# Patient Record
Sex: MALE | Race: WHITE | Employment: OTHER | URBAN - METROPOLITAN AREA
[De-identification: names, ages, dates, MRNs, and addresses within clinical notes are randomized per-mention and may not be internally consistent; named-entity substitution may affect disease eponyms.]

---

## 2017-01-23 ENCOUNTER — TELEPHONE (OUTPATIENT)
Dept: FAMILY MEDICINE CLINIC | Age: 47
End: 2017-01-23

## 2017-01-23 DIAGNOSIS — E78.5 HYPERLIPIDEMIA, UNSPECIFIED HYPERLIPIDEMIA TYPE: Primary | ICD-10-CM

## 2017-01-31 LAB
ALBUMIN SERPL-MCNC: 4.7 G/DL (ref 3.2–5.3)
ALK PHOSPHATASE: 51 IU/L (ref 35–121)
ALT SERPL-CCNC: 107 IU/L (ref 5–59)
ANION GAP SERPL CALCULATED.3IONS-SCNC: 10 MMOL/L
AST SERPL-CCNC: 38 IU/L (ref 10–42)
BILIRUB SERPL-MCNC: 0.7 MG/DL (ref 0.2–1.3)
BUN BLDV-MCNC: 19 MG/DL (ref 10–20)
CALCIUM SERPL-MCNC: 10.1 MG/DL (ref 8.7–10.8)
CHLORIDE BLD-SCNC: 101 MMOL/L (ref 95–111)
CHOLESTEROL/HDL RATIO: 7.9
CHOLESTEROL: 314 MG/DL
CO2: 30 MMOL/L (ref 21–32)
CREAT SERPL-MCNC: 1 MG/DL (ref 0.5–1.3)
EGFR AFRICAN AMERICAN: 98
EGFR IF NONAFRICAN AMERICAN: 81
GLUCOSE: 95 MG/DL (ref 70–100)
HDLC SERPL-MCNC: 40 MG/DL (ref 40–60)
POTASSIUM SERPL-SCNC: 4.1 MMOL/L (ref 3.5–5.4)
SODIUM BLD-SCNC: 137 MMOL/L (ref 134–147)
TOTAL PROTEIN: 7.5 G/DL (ref 5.8–8)
TRIGL SERPL-MCNC: 681 MG/DL

## 2017-02-01 ENCOUNTER — OFFICE VISIT (OUTPATIENT)
Dept: FAMILY MEDICINE CLINIC | Age: 47
End: 2017-02-01

## 2017-02-01 VITALS
BODY MASS INDEX: 29.03 KG/M2 | SYSTOLIC BLOOD PRESSURE: 124 MMHG | RESPIRATION RATE: 12 BRPM | HEART RATE: 76 BPM | HEIGHT: 69 IN | DIASTOLIC BLOOD PRESSURE: 96 MMHG | TEMPERATURE: 98.1 F | WEIGHT: 196 LBS

## 2017-02-01 DIAGNOSIS — E66.3 OVERWEIGHT (BMI 25.0-29.9): ICD-10-CM

## 2017-02-01 DIAGNOSIS — E78.2 MIXED HYPERLIPIDEMIA: Primary | ICD-10-CM

## 2017-02-01 DIAGNOSIS — R74.01 ELEVATED ALANINE AMINOTRANSFERASE (ALT) LEVEL: ICD-10-CM

## 2017-02-01 DIAGNOSIS — Z11.4 SCREENING FOR HIV (HUMAN IMMUNODEFICIENCY VIRUS): ICD-10-CM

## 2017-02-01 PROCEDURE — 99214 OFFICE O/P EST MOD 30 MIN: CPT | Performed by: NURSE PRACTITIONER

## 2017-02-01 RX ORDER — ATORVASTATIN CALCIUM 20 MG/1
20 TABLET, FILM COATED ORAL DAILY
Qty: 30 TABLET | Refills: 1 | Status: SHIPPED | OUTPATIENT
Start: 2017-02-01 | End: 2017-03-30 | Stop reason: SDUPTHER

## 2017-02-01 ASSESSMENT — ENCOUNTER SYMPTOMS
APNEA: 0
CONSTIPATION: 0
TROUBLE SWALLOWING: 0
VOMITING: 0
SHORTNESS OF BREATH: 0
ABDOMINAL PAIN: 0
DIARRHEA: 0
ANAL BLEEDING: 0
PHOTOPHOBIA: 0
NAUSEA: 0
WHEEZING: 0
SORE THROAT: 1
COUGH: 1
VOICE CHANGE: 0
BLOOD IN STOOL: 0
ABDOMINAL DISTENTION: 0

## 2017-03-30 DIAGNOSIS — E78.2 MIXED HYPERLIPIDEMIA: ICD-10-CM

## 2017-03-30 RX ORDER — ATORVASTATIN CALCIUM 20 MG/1
TABLET, FILM COATED ORAL
Qty: 30 TABLET | Refills: 2 | Status: SHIPPED | OUTPATIENT
Start: 2017-03-30 | End: 2017-06-29 | Stop reason: SDUPTHER

## 2017-06-29 DIAGNOSIS — E78.2 MIXED HYPERLIPIDEMIA: ICD-10-CM

## 2017-06-29 RX ORDER — ATORVASTATIN CALCIUM 20 MG/1
TABLET, FILM COATED ORAL
Qty: 30 TABLET | Refills: 0 | Status: SHIPPED | OUTPATIENT
Start: 2017-06-29 | End: 2017-07-29 | Stop reason: SDUPTHER

## 2017-07-29 DIAGNOSIS — E78.2 MIXED HYPERLIPIDEMIA: ICD-10-CM

## 2017-08-01 RX ORDER — ATORVASTATIN CALCIUM 20 MG/1
TABLET, FILM COATED ORAL
Qty: 30 TABLET | Refills: 0 | Status: SHIPPED | OUTPATIENT
Start: 2017-08-01 | End: 2017-08-31 | Stop reason: DRUGHIGH

## 2017-08-26 DIAGNOSIS — E78.2 MIXED HYPERLIPIDEMIA: ICD-10-CM

## 2017-08-31 ENCOUNTER — TELEPHONE (OUTPATIENT)
Dept: FAMILY MEDICINE CLINIC | Age: 47
End: 2017-08-31

## 2017-08-31 DIAGNOSIS — E78.2 MIXED HYPERLIPIDEMIA: Primary | ICD-10-CM

## 2017-08-31 DIAGNOSIS — R74.01 ELEVATED ALANINE AMINOTRANSFERASE (ALT) LEVEL: ICD-10-CM

## 2017-08-31 LAB
ALT SERPL-CCNC: 113 IU/L (ref 5–59)
AST SERPL-CCNC: 49 IU/L (ref 10–42)
CHOLESTEROL/HDL RATIO: 4.9
CHOLESTEROL: 183 MG/DL
HDLC SERPL-MCNC: 37 MG/DL (ref 40–60)
HIV 1,2 COMBO ANTIGEN/ANTIBODY: NORMAL
TRIGL SERPL-MCNC: 418 MG/DL

## 2017-08-31 RX ORDER — ATORVASTATIN CALCIUM 40 MG/1
40 TABLET, FILM COATED ORAL DAILY
Qty: 30 TABLET | Refills: 1 | Status: SHIPPED | OUTPATIENT
Start: 2017-08-31 | End: 2017-10-26 | Stop reason: SDUPTHER

## 2017-09-13 ENCOUNTER — TELEPHONE (OUTPATIENT)
Dept: FAMILY MEDICINE CLINIC | Age: 47
End: 2017-09-13

## 2017-09-13 RX ORDER — ATORVASTATIN CALCIUM 20 MG/1
TABLET, FILM COATED ORAL
Qty: 90 TABLET | Refills: 3 | OUTPATIENT
Start: 2017-09-13

## 2017-09-14 ENCOUNTER — HOSPITAL ENCOUNTER (OUTPATIENT)
Dept: GENERAL RADIOLOGY | Age: 47
Discharge: HOME | End: 2017-09-14
Payer: COMMERCIAL

## 2017-09-14 ENCOUNTER — HOSPITAL ENCOUNTER (OUTPATIENT)
Age: 47
Discharge: HOME | End: 2017-09-14
Payer: COMMERCIAL

## 2017-09-14 ENCOUNTER — OFFICE VISIT (OUTPATIENT)
Dept: FAMILY MEDICINE CLINIC | Age: 47
End: 2017-09-14
Payer: COMMERCIAL

## 2017-09-14 VITALS
DIASTOLIC BLOOD PRESSURE: 96 MMHG | RESPIRATION RATE: 14 BRPM | TEMPERATURE: 98 F | SYSTOLIC BLOOD PRESSURE: 124 MMHG | WEIGHT: 197.8 LBS | HEIGHT: 69 IN | BODY MASS INDEX: 29.3 KG/M2 | HEART RATE: 66 BPM

## 2017-09-14 DIAGNOSIS — M25.472 EDEMA OF LEFT ANKLE: ICD-10-CM

## 2017-09-14 DIAGNOSIS — M25.572 ACUTE LEFT ANKLE PAIN: ICD-10-CM

## 2017-09-14 DIAGNOSIS — L03.116 CELLULITIS OF LEFT LOWER EXTREMITY: ICD-10-CM

## 2017-09-14 DIAGNOSIS — M25.572 ACUTE LEFT ANKLE PAIN: Primary | ICD-10-CM

## 2017-09-14 LAB
BASOPHILS # BLD: 0.3 %
BASOPHILS ABSOLUTE: 0 THOU/MM3 (ref 0–0.1)
EOSINOPHIL # BLD: 0.8 %
EOSINOPHILS ABSOLUTE: 0.1 THOU/MM3 (ref 0–0.4)
HCT VFR BLD CALC: 44.2 % (ref 42–52)
HEMOGLOBIN: 15.5 GM/DL (ref 14–18)
LYMPHOCYTES # BLD: 23 %
LYMPHOCYTES ABSOLUTE: 1.7 THOU/MM3 (ref 1–4.8)
MCH RBC QN AUTO: 31.4 PG (ref 27–31)
MCHC RBC AUTO-ENTMCNC: 35.1 GM/DL (ref 33–37)
MCV RBC AUTO: 89.6 FL (ref 80–94)
MONOCYTES # BLD: 11.7 %
MONOCYTES ABSOLUTE: 0.9 THOU/MM3 (ref 0.4–1.3)
NUCLEATED RED BLOOD CELLS: 0 /100 WBC
PDW BLD-RTO: 12.5 % (ref 11.5–14.5)
PLATELET # BLD: 256 THOU/MM3 (ref 130–400)
PMV BLD AUTO: 7.9 MCM (ref 7.4–10.4)
RBC # BLD: 4.94 MILL/MM3 (ref 4.7–6.1)
RBC # BLD: NORMAL 10*6/UL
SEG NEUTROPHILS: 64.2 %
SEGMENTED NEUTROPHILS ABSOLUTE COUNT: 4.8 THOU/MM3 (ref 1.8–7.7)
URIC ACID: 6.1 MG/DL (ref 3.7–7)
WBC # BLD: 7.4 THOU/MM3 (ref 4.8–10.8)

## 2017-09-14 PROCEDURE — 99213 OFFICE O/P EST LOW 20 MIN: CPT | Performed by: NURSE PRACTITIONER

## 2017-09-14 PROCEDURE — 36415 COLL VENOUS BLD VENIPUNCTURE: CPT

## 2017-09-14 PROCEDURE — 84550 ASSAY OF BLOOD/URIC ACID: CPT

## 2017-09-14 PROCEDURE — 73630 X-RAY EXAM OF FOOT: CPT

## 2017-09-14 PROCEDURE — 85025 COMPLETE CBC W/AUTO DIFF WBC: CPT

## 2017-09-14 RX ORDER — CEPHALEXIN 500 MG/1
500 CAPSULE ORAL EVERY 6 HOURS
Qty: 28 CAPSULE | Refills: 0 | Status: SHIPPED | OUTPATIENT
Start: 2017-09-14 | End: 2017-09-21

## 2017-09-14 ASSESSMENT — ENCOUNTER SYMPTOMS
PHOTOPHOBIA: 0
APNEA: 0
ABDOMINAL DISTENTION: 0
ANAL BLEEDING: 0
ABDOMINAL PAIN: 0
COUGH: 0
CONSTIPATION: 0
TROUBLE SWALLOWING: 0
NAUSEA: 0
DIARRHEA: 0
VOMITING: 0
SHORTNESS OF BREATH: 0
BLOOD IN STOOL: 0
WHEEZING: 0
VOICE CHANGE: 0

## 2017-09-27 ENCOUNTER — TELEPHONE (OUTPATIENT)
Dept: FAMILY MEDICINE CLINIC | Age: 47
End: 2017-09-27

## 2017-09-27 DIAGNOSIS — M10.9 GOUT OF ANKLE, UNSPECIFIED CAUSE, UNSPECIFIED CHRONICITY, UNSPECIFIED LATERALITY: ICD-10-CM

## 2017-09-27 DIAGNOSIS — L03.90 CELLULITIS, UNSPECIFIED CELLULITIS SITE: Primary | ICD-10-CM

## 2017-09-27 RX ORDER — INDOMETHACIN 50 MG/1
50 CAPSULE ORAL
Qty: 21 CAPSULE | Refills: 0 | Status: SHIPPED | OUTPATIENT
Start: 2017-09-27 | End: 2018-02-02 | Stop reason: ALTCHOICE

## 2017-09-28 RX ORDER — CEPHALEXIN 500 MG/1
500 CAPSULE ORAL EVERY 6 HOURS
Qty: 28 CAPSULE | Refills: 0 | Status: SHIPPED | OUTPATIENT
Start: 2017-09-28 | End: 2017-10-05

## 2017-10-26 RX ORDER — ATORVASTATIN CALCIUM 40 MG/1
TABLET, FILM COATED ORAL
Qty: 30 TABLET | Refills: 0 | Status: SHIPPED | OUTPATIENT
Start: 2017-10-26 | End: 2017-10-31 | Stop reason: SDUPTHER

## 2017-10-26 NOTE — TELEPHONE ENCOUNTER
This is regarding a medication refill request from [facility] for Lipitor 40mg 1 tablet QD  Last written:08/31/2017 30 tablets with 1 refill  Last seen:09/14/2017, No future appointments  Last FLP done:08/30/2017, Next LDL,HDL due:10/12/2017  I spoke to the patient and he states has about 2-3 pills left and plans on getting labs done tomorrow.  Patient is request a small supply incase labs do not come back in time  Rx verified,ordered,and set to escribe

## 2017-10-28 LAB
ALT SERPL-CCNC: 111 IU/L (ref 5–59)
AST SERPL-CCNC: 48 IU/L (ref 10–42)
HDLC SERPL-MCNC: 39 MG/DL (ref 40–60)
TRIGL SERPL-MCNC: 357 MG/DL

## 2017-10-30 ENCOUNTER — TELEPHONE (OUTPATIENT)
Dept: FAMILY MEDICINE CLINIC | Age: 47
End: 2017-10-30

## 2017-10-30 DIAGNOSIS — E78.2 MIXED HYPERLIPIDEMIA: Primary | ICD-10-CM

## 2017-10-30 LAB — LDL CHOLESTEROL DIRECT: 88 MG/DL

## 2017-10-30 NOTE — TELEPHONE ENCOUNTER
Patient notified. We had a lengthy conversation and patient is wanting to know if ES would be willing to give refills on Atorvastatin to get him through the next 3-4 months and recheck labs at that time. He believes that he could make some dietary changes that would make a difference with his triglycerides. As it stands, BK had only given the patient a 30 day supply of medication and an order for labs to be done in 6 weeks. Due to his insurance (he pays for most of his medical expenses out of pocket), he does not want to have to get labs done every 6 weeks at this time. This is why he is not willing to start the Tricor. Please advise. If you are agreeable, patient does not need a callback--just mail orders and send additional refills to [facility].

## 2017-10-30 NOTE — TELEPHONE ENCOUNTER
I spoke to the pt and notified him of the lab results and BK recommendations. He states that he hasn't missed any doses of Atorvastatin 40 mg. He is not willing to start Tricor or check a gallbladder ultrasound at this time. He would like to continue taking the Atorvastatin at the current dose for another 6 months to see if he can lower the numbers that way and if not will think more about the recommendations. He would like to give the Atorvastatin a little more time to work. Pt states he hasn't taken Tylenol in 1 month and doesn't drink any alcohol. Please advise if OK to recheck recommended labs in 6 weeks. Please provide DX for CBD, LDH and CPK.

## 2017-10-31 RX ORDER — ATORVASTATIN CALCIUM 40 MG/1
TABLET, FILM COATED ORAL
Qty: 30 TABLET | Refills: 0 | Status: SHIPPED | OUTPATIENT
Start: 2017-10-31 | End: 2017-12-26 | Stop reason: SDUPTHER

## 2017-11-27 RX ORDER — ATORVASTATIN CALCIUM 40 MG/1
TABLET, FILM COATED ORAL
Qty: 30 TABLET | Refills: 0 | OUTPATIENT
Start: 2017-11-27

## 2017-11-27 NOTE — TELEPHONE ENCOUNTER
This is regarding a refill request from [facility] for Atorvastatin 40 mg QD. Rx last written 10/31/17 for a 30 day supply. The pt was mailed orders for labs to be done in 6 weeks (week of 12/11/17). The previous Rx for Atorvastatin was written by EILEEN on 10/26/17 for a month supply. I called [phone]. and the pt picked up the last refill today for #30 no refill which should last him until his labs are drawn. Rx refused.

## 2017-12-26 NOTE — TELEPHONE ENCOUNTER
12/26/17 FYI -Patient requesting the cholesterol med refill as out of medicine. Labs have not been done and patient couldn't say when he would get done. Possibly, later this week but didn't think would be accurate if he is out of medicine few days prior to testing.    Thanks/blm

## 2017-12-27 RX ORDER — ATORVASTATIN CALCIUM 40 MG/1
TABLET, FILM COATED ORAL
Qty: 30 TABLET | Refills: 2 | Status: SHIPPED | OUTPATIENT
Start: 2017-12-27 | End: 2018-02-02 | Stop reason: SDUPTHER

## 2017-12-27 NOTE — TELEPHONE ENCOUNTER
This is regarding a medication refill request from [facility] for Lipitor 40mg 1 tablet QD  Last written:10/31/2017 30 tablets with 0 refills  Last seen:09/14/2017, No future appointments  Last LDL,HDL,TRI:10/27/2017, Next HDL,TRI due:03/20/2018  Rx verified,ordered,and set to escribe

## 2017-12-29 ENCOUNTER — TELEPHONE (OUTPATIENT)
Dept: FAMILY MEDICINE CLINIC | Age: 47
End: 2017-12-29

## 2017-12-29 DIAGNOSIS — Z00.00 LABORATORY EXAMINATION ORDERED AS PART OF A ROUTINE GENERAL MEDICAL EXAMINATION: Primary | ICD-10-CM

## 2017-12-29 DIAGNOSIS — E78.5 HYPERLIPIDEMIA, UNSPECIFIED HYPERLIPIDEMIA TYPE: ICD-10-CM

## 2017-12-29 NOTE — TELEPHONE ENCOUNTER
Last seen:09/14/2017  Labs ordered 10/31/2017 to be done 03/20/2018-AST,ALT,HDL,TRI  Please advise on appointment time and day.

## 2018-01-30 LAB
ALBUMIN SERPL-MCNC: 4 G/DL (ref 3.2–5.3)
ALK PHOSPHATASE: 50 IU/L (ref 35–121)
ALT SERPL-CCNC: 33 IU/L (ref 5–59)
ANION GAP SERPL CALCULATED.3IONS-SCNC: 9 MMOL/L
AST SERPL-CCNC: 17 IU/L (ref 10–42)
BILIRUB SERPL-MCNC: 0.9 MG/DL (ref 0.2–1.3)
BUN BLDV-MCNC: 10 MG/DL (ref 10–20)
CALCIUM SERPL-MCNC: 9.2 MG/DL (ref 8.7–10.8)
CHLORIDE BLD-SCNC: 106 MMOL/L (ref 95–111)
CHOLESTEROL/HDL RATIO: 3.6
CHOLESTEROL: 139 MG/DL
CO2: 31 MMOL/L (ref 21–32)
CREAT SERPL-MCNC: 1 MG/DL (ref 0.5–1.3)
EGFR AFRICAN AMERICAN: 98
EGFR IF NONAFRICAN AMERICAN: 81
GLUCOSE: 112 MG/DL (ref 70–100)
HDLC SERPL-MCNC: 39 MG/DL (ref 40–60)
LDL CHOLESTEROL CALCULATED: 48 MG/DL
LDL/HDL RATIO: 1.2
POTASSIUM SERPL-SCNC: 4.2 MMOL/L (ref 3.5–5.4)
SODIUM BLD-SCNC: 142 MMOL/L (ref 134–147)
TOTAL PROTEIN: 6 G/DL (ref 5.8–8)
TRIGL SERPL-MCNC: 260 MG/DL
VLDLC SERPL CALC-MCNC: 52 MG/DL

## 2018-02-02 ENCOUNTER — OFFICE VISIT (OUTPATIENT)
Dept: FAMILY MEDICINE CLINIC | Age: 48
End: 2018-02-02
Payer: COMMERCIAL

## 2018-02-02 VITALS
DIASTOLIC BLOOD PRESSURE: 84 MMHG | RESPIRATION RATE: 20 BRPM | HEIGHT: 69 IN | BODY MASS INDEX: 28.14 KG/M2 | WEIGHT: 190 LBS | TEMPERATURE: 98.1 F | SYSTOLIC BLOOD PRESSURE: 120 MMHG | HEART RATE: 72 BPM

## 2018-02-02 DIAGNOSIS — S46.212A STRAIN OF LEFT BICEPS, INITIAL ENCOUNTER: ICD-10-CM

## 2018-02-02 DIAGNOSIS — E78.5 HYPERLIPIDEMIA, UNSPECIFIED HYPERLIPIDEMIA TYPE: ICD-10-CM

## 2018-02-02 DIAGNOSIS — S56.912A STRAIN OF LEFT FOREARM, INITIAL ENCOUNTER: ICD-10-CM

## 2018-02-02 DIAGNOSIS — Z00.00 WELL ADULT EXAM: Primary | ICD-10-CM

## 2018-02-02 DIAGNOSIS — R73.01 IFG (IMPAIRED FASTING GLUCOSE): ICD-10-CM

## 2018-02-02 PROCEDURE — 99396 PREV VISIT EST AGE 40-64: CPT | Performed by: FAMILY MEDICINE

## 2018-02-02 PROCEDURE — 83036 HEMOGLOBIN GLYCOSYLATED A1C: CPT | Performed by: FAMILY MEDICINE

## 2018-02-02 RX ORDER — ATORVASTATIN CALCIUM 40 MG/1
40 TABLET, FILM COATED ORAL DAILY
Qty: 90 TABLET | Refills: 3 | Status: SHIPPED | OUTPATIENT
Start: 2018-02-02 | End: 2019-02-04 | Stop reason: SDUPTHER

## 2018-02-02 ASSESSMENT — PATIENT HEALTH QUESTIONNAIRE - PHQ9
SUM OF ALL RESPONSES TO PHQ9 QUESTIONS 1 & 2: 0
SUM OF ALL RESPONSES TO PHQ QUESTIONS 1-9: 0
1. LITTLE INTEREST OR PLEASURE IN DOING THINGS: 0
2. FEELING DOWN, DEPRESSED OR HOPELESS: 0

## 2018-02-02 ASSESSMENT — ENCOUNTER SYMPTOMS
DIARRHEA: 0
BLOOD IN STOOL: 0
VOMITING: 0
CONSTIPATION: 0
SHORTNESS OF BREATH: 0
ANAL BLEEDING: 0
CHEST TIGHTNESS: 0
ABDOMINAL PAIN: 0
NAUSEA: 0

## 2018-02-02 NOTE — PROGRESS NOTES
[facility]  [address] [place] [address] Rd[phone]  Dept: [phone]  Dept Fax: [phone]  KRYSTYNA Berrios is a 41 y.o.male    Pt presents for annual wellness physical exam.      Pt states he has changed diet significantly over past several months in an effort to improve Hyperlipidemia. Pt complains of left forearm pain- slightly above and below elbow joint. Worse with arm curls while working out.  + twinge with carrying milk. Exercise?  2-3x/week- weights and cardio (elliptical). Patient Active Problem List   Diagnosis    Hyperlipidemia    Elevated alanine aminotransferase (ALT) level       Review of Systems   Constitutional: Negative for chills, diaphoresis, fatigue, fever and unexpected weight change. Eyes: Negative for visual disturbance. Respiratory: Negative for chest tightness and shortness of breath. Cardiovascular: Negative for chest pain, palpitations and leg swelling. Gastrointestinal: Negative for abdominal pain, anal bleeding, blood in stool, constipation, diarrhea, nausea and vomiting. Genitourinary: Negative for dysuria and hematuria. Musculoskeletal: Negative for neck pain. Neurological: Negative for dizziness, light-headedness and headaches. OBJECTIVE     /84 (Site: Left Arm, Cuff Size: Large Adult)   Pulse 72   Temp 98.1 °F (36.7 °C) (Oral)   Resp 20   Ht 5' 9\" (1.753 m)   Wt 190 lb (86.2 kg)   BMI 28.06 kg/m²     Wt Readings from Last 3 Encounters:   02/02/18 190 lb (86.2 kg)   09/14/17 197 lb 12.8 oz (89.7 kg)   02/01/17 196 lb (88.9 kg)       Physical Exam   Constitutional: He is oriented to person, place, and time. He appears well-developed and well-nourished. HENT:   Head: Normocephalic and atraumatic.    Right Ear: External ear normal.   Left Ear: External ear normal.   Nose: Nose normal.   Mouth/Throat: Oropharynx is clear and moist.   Eyes: Conjunctivae and EOM are normal. Pupils are equal, round, and reactive to light. Neck: Normal range of motion. Neck supple. Cardiovascular: Normal rate, regular rhythm and intact distal pulses. Pulmonary/Chest: Effort normal and breath sounds normal.   Abdominal: Soft. Bowel sounds are normal.   Genitourinary:   Genitourinary Comments: ZACHARIAH deferred today as pt currently asymptomatic. Pt denies dysuria, hematuria, frequency, urgency, difficulty starting/ stopping stream, frequent nocturia    Will reconsider annually. ES     Musculoskeletal: Normal range of motion. He exhibits tenderness. Edema: al biceps and over left brachioradialis muscle. Neurological: He is alert and oriented to person, place, and time. He has normal reflexes. Skin: Skin is warm and dry. Psychiatric: He has a normal mood and affect. His behavior is normal. Judgment and thought content normal.   Nursing note and vitals reviewed.     Component      Latest Ref Rng & Units 1/29/2018   Glucose      70 - 100 mg/dL 112 (H)   BUN      10 - 20 mg/dl 10   Creatinine      0.5 - 1.3 mg/dl 1.0   eGFR African American      >60 98   EGFR IF NonAfrican American      >60 81   Calcium      8.7 - 10.8 mg/dl 9.2   Sodium      134 - 147 mmol/L 142   Potassium      3.5 - 5.4 mmol/L 4.2   Chloride      95 - 111 mmol/L 106   CO2      21 - 32 mmol/L 31   Anion Gap       9   Bilirubin      0.2 - 1.3 mg/dl 0.9   Alk Phosphatase      35 - 121 IU/L 50   AST      10 - 42 IU/L 17   ALT      5 - 59 IU/L 33   Total Protein      5.8 - 8.0 g/dl 6.0   Albumin      3.2 - 5.3 g/dl 4.0   Cholesterol      <200 mg/dl 139   Triglycerides      <150 mg/dl 260 (H)   HDL Cholesterol      40 - 60 mg/dl 39 (L)   LDL Calculated      <130 mg/dL 48   VLDL Cholesterol Calculated      <30 mg/dL 52 (H)   LDl/HDL Ratio      <3.5 1.2   Chol/HDL Ratio      <5 3.6       Lab Results   Component Value Date     01/29/2018    K 4.2 01/29/2018     01/29/2018    CO2 31 01/29/2018    BUN 10 01/29/2018    CREATININE 1.0 01/29/2018    GLUCOSE 112

## 2018-02-02 NOTE — PATIENT INSTRUCTIONS
Encouraged annual FLU VACCINE- pt declines. (updated 2/2/2018)  Check HGA1C in office today. After discussion with pt, will continue Lipitor 40 mg- 1 pill daily at this time. Will continue to work on diet, exercise, and weight loss at this itme. Check TG and HDL in 3 months. Continue current meds  Refills   PAIN IS YOUR GUIDE- IF IT HURTS, YOU ARE DOING TOO MUCH! Ice in PM, Heat in AM- 20 minutes on, 1 hour off. Aleve- 1-2 pills 2x/day with or food or Ibuprofen 200 mg- 2-4 pills 3x/day with food. Tylenol 8 Hour- 2 pills every 8 hours as needed for pain. Encouraged Physical Therapy- pt would like to hold and let us know if persists. Follow up in 12 months if doing well and labs controlled.

## 2018-06-21 RX ORDER — ATORVASTATIN CALCIUM 10 MG/1
10 TABLET, FILM COATED ORAL DAILY
Qty: 30 TABLET | Refills: 1 | Status: CANCELLED | OUTPATIENT
Start: 2018-06-21

## 2019-02-04 ENCOUNTER — OFFICE VISIT (OUTPATIENT)
Dept: FAMILY MEDICINE CLINIC | Age: 49
End: 2019-02-04
Payer: COMMERCIAL

## 2019-02-04 VITALS
HEART RATE: 64 BPM | HEIGHT: 69 IN | SYSTOLIC BLOOD PRESSURE: 114 MMHG | DIASTOLIC BLOOD PRESSURE: 80 MMHG | WEIGHT: 197.2 LBS | TEMPERATURE: 97.7 F | RESPIRATION RATE: 16 BRPM | BODY MASS INDEX: 29.21 KG/M2

## 2019-02-04 DIAGNOSIS — R73.01 IFG (IMPAIRED FASTING GLUCOSE): ICD-10-CM

## 2019-02-04 DIAGNOSIS — Z00.00 WELL ADULT EXAM: Primary | ICD-10-CM

## 2019-02-04 DIAGNOSIS — E78.5 HYPERLIPIDEMIA, UNSPECIFIED HYPERLIPIDEMIA TYPE: ICD-10-CM

## 2019-02-04 DIAGNOSIS — Z00.00 LABORATORY EXAMINATION ORDERED AS PART OF A ROUTINE GENERAL MEDICAL EXAMINATION: ICD-10-CM

## 2019-02-04 PROCEDURE — 99396 PREV VISIT EST AGE 40-64: CPT | Performed by: FAMILY MEDICINE

## 2019-02-04 RX ORDER — ATORVASTATIN CALCIUM 40 MG/1
40 TABLET, FILM COATED ORAL DAILY
Qty: 90 TABLET | Refills: 3 | Status: SHIPPED | OUTPATIENT
Start: 2019-02-04 | End: 2020-01-09 | Stop reason: SDUPTHER

## 2019-02-04 ASSESSMENT — PATIENT HEALTH QUESTIONNAIRE - PHQ9
SUM OF ALL RESPONSES TO PHQ QUESTIONS 1-9: 0
SUM OF ALL RESPONSES TO PHQ QUESTIONS 1-9: 0
1. LITTLE INTEREST OR PLEASURE IN DOING THINGS: 0
SUM OF ALL RESPONSES TO PHQ9 QUESTIONS 1 & 2: 0
2. FEELING DOWN, DEPRESSED OR HOPELESS: 0

## 2019-02-04 ASSESSMENT — ENCOUNTER SYMPTOMS
NAUSEA: 0
ABDOMINAL PAIN: 0
BLOOD IN STOOL: 0
SHORTNESS OF BREATH: 0
VOMITING: 0
CHEST TIGHTNESS: 0
CONSTIPATION: 0
DIARRHEA: 0
ANAL BLEEDING: 0

## 2019-07-23 ENCOUNTER — TELEPHONE (OUTPATIENT)
Dept: FAMILY MEDICINE CLINIC | Age: 49
End: 2019-07-23

## 2020-01-09 ENCOUNTER — OFFICE VISIT (OUTPATIENT)
Dept: FAMILY MEDICINE CLINIC | Age: 50
End: 2020-01-09
Payer: COMMERCIAL

## 2020-01-09 VITALS
TEMPERATURE: 98.2 F | DIASTOLIC BLOOD PRESSURE: 78 MMHG | WEIGHT: 198.8 LBS | SYSTOLIC BLOOD PRESSURE: 118 MMHG | RESPIRATION RATE: 16 BRPM | HEART RATE: 68 BPM | BODY MASS INDEX: 29.36 KG/M2

## 2020-01-09 PROCEDURE — 99214 OFFICE O/P EST MOD 30 MIN: CPT | Performed by: NURSE PRACTITIONER

## 2020-01-09 RX ORDER — ATORVASTATIN CALCIUM 40 MG/1
40 TABLET, FILM COATED ORAL DAILY
Qty: 90 TABLET | Refills: 3 | Status: SHIPPED | OUTPATIENT
Start: 2020-01-09 | End: 2021-01-20

## 2020-01-09 ASSESSMENT — ENCOUNTER SYMPTOMS
ABDOMINAL PAIN: 1
BACK PAIN: 0
COLOR CHANGE: 0
BLOOD IN STOOL: 0
VOMITING: 0
DIARRHEA: 0
NAUSEA: 0
CONSTIPATION: 0
COUGH: 0
BELCHING: 0
WHEEZING: 0
SHORTNESS OF BREATH: 0

## 2020-01-09 ASSESSMENT — PATIENT HEALTH QUESTIONNAIRE - PHQ9
SUM OF ALL RESPONSES TO PHQ QUESTIONS 1-9: 0
SUM OF ALL RESPONSES TO PHQ QUESTIONS 1-9: 0
1. LITTLE INTEREST OR PLEASURE IN DOING THINGS: 0
2. FEELING DOWN, DEPRESSED OR HOPELESS: 0
SUM OF ALL RESPONSES TO PHQ9 QUESTIONS 1 & 2: 0

## 2020-01-09 NOTE — PROGRESS NOTES
deviation. Cardiovascular:      Rate and Rhythm: Normal rate and regular rhythm. Heart sounds: Normal heart sounds. No murmur. Pulmonary:      Effort: Pulmonary effort is normal. No respiratory distress. Breath sounds: Normal breath sounds. Abdominal:      General: Abdomen is flat. Bowel sounds are normal. There is no distension. Palpations: Abdomen is soft. Tenderness: There is tenderness in the left lower quadrant. There is no right CVA tenderness, left CVA tenderness, guarding or rebound. Negative signs include Lopez's sign, Rovsing's sign and McBurney's sign. Skin:     General: Skin is warm and dry. Findings: No rash. Neurological:      General: No focal deficit present. Mental Status: He is alert and oriented to person, place, and time. Coordination: Coordination normal.   Psychiatric:         Behavior: Behavior normal.         Thought Content: Thought content normal.         Judgment: Judgment normal.         Assessment/Plan:      Agnes Johnson was seen today for abdominal pain and medication refill. Diagnoses and all orders for this visit:    LLQ abdominal pain  -     Comprehensive Metabolic Panel; Future  -     CBC Auto Differential; Future    Decreased appetite    Hyperlipidemia, unspecified hyperlipidemia type  -     Lipid Panel; Future  -     atorvastatin (LIPITOR) 40 MG tablet; Take 1 tablet by mouth daily    - Long discussion with patient about options, including a GI referral due to family history of GI issues. Since pain is improving, pt would like to start with blood work and see what that shows. - Rest and continue to monitor symptoms. If pain gets worse or fever develops, go to ER or call office . Return in about 1 year (around 1/9/2021), or if symptoms worsen or fail to improve, for Wellness/Physical.    Patient given educational materials - see patient instructions. Discussed use, benefit, and side effects of prescribed medications.   All patient questions answered. Pt voiced understanding.         Electronically signed by SANDRA Roldan CNP on 1/10/2020 at 11:28 AM

## 2020-01-09 NOTE — PATIENT INSTRUCTIONS
These can cause stomach upset. Talk to your doctor if you take daily aspirin for another health problem. When should you call for help? Call 911 anytime you think you may need emergency care. For example, call if:    · You passed out (lost consciousness).     · You pass maroon or very bloody stools.     · You vomit blood or what looks like coffee grounds.     · You have new, severe belly pain.    Call your doctor now or seek immediate medical care if:    · Your pain gets worse, especially if it becomes focused in one area of your belly.     · You have a new or higher fever.     · Your stools are black and look like tar, or they have streaks of blood.     · You have unexpected vaginal bleeding.     · You have symptoms of a urinary tract infection. These may include:  ? Pain when you urinate. ? Urinating more often than usual.  ? Blood in your urine.     · You are dizzy or lightheaded, or you feel like you may faint.    Watch closely for changes in your health, and be sure to contact your doctor if:    · You are not getting better after 1 day (24 hours). Where can you learn more? Go to https://TrustTeampeZ2.DDRdrive. org and sign in to your [employer] account. Enter M346 in the Kadenze box to learn more about \"Abdominal Pain: Care Instructions. \"     If you do not have an account, please click on the \"Sign Up Now\" link. Current as of: June 26, 2019  Content Version: 12.3  © 6080-3779 M87. Care instructions adapted under license by [facility] ([facility]). If you have questions about a medical condition or this instruction, always ask your healthcare professional. [place] [address] any warranty or liability for your use of this information. Patient Education        Learning About High Cholesterol  What is high cholesterol? Cholesterol is a type of fat in your blood. It is needed for many body functions, such as making new cells.  Cholesterol is made by your body. It also comes from food you eat. If you have too much cholesterol, it starts to build up in your arteries. This is called hardening of the arteries, or atherosclerosis. High cholesterol raises your risk of a heart attack and stroke. There are different types of cholesterol. LDL is the \"bad\" cholesterol. High LDL can raise your risk for heart disease, heart attack, and stroke. HDL is the \"good\" cholesterol. High HDL is linked with a lower risk for heart disease, heart attack, and stroke. Your cholesterol levels help your doctor find out your risk for having a heart attack or stroke. How can you prevent high cholesterol? A heart-healthy lifestyle can help you prevent high cholesterol. This lifestyle helps lower your risk for a heart attack and stroke. · Eat heart-healthy foods. ? Eat fruits, vegetables, whole grains (like oatmeal), dried beans and peas, nuts and seeds, soy products (like tofu), and fat-free or low-fat dairy products. ? Replace butter, margarine, and hydrogenated or partially hydrogenated oils with olive and canola oils. (Canola oil margarine without trans fat is fine.)  ? Replace red meat with fish, poultry, and soy protein (like tofu). ? Limit processed and packaged foods like chips, crackers, and cookies. · Be active. Exercise can improve your cholesterol level. Get at least 30 minutes of exercise on most days of the week. Walking is a good choice. You also may want to do other activities, such as running, swimming, cycling, or playing tennis or team sports. · Stay at a healthy weight. Lose weight if you need to. · Don't smoke. If you need help quitting, talk to your doctor about stop-smoking programs and medicines. These can increase your chances of quitting for good. How is high cholesterol treated? The goal of treatment is to reduce your chances of having a heart attack or stroke. The goal is not to lower your cholesterol numbers only.   · You may make lifestyle changes, such as eating healthy foods, not smoking, losing weight, and being more active. · You may have to take medicine. Follow-up care is a key part of your treatment and safety. Be sure to make and go to all appointments, and call your doctor if you are having problems. It's also a good idea to know your test results and keep a list of the medicines you take. Where can you learn more? Go to https://MondokiopeCall Britanniaeb.TRAFFIQ. org and sign in to your Kippt account. Enter H923 in the [employer] box to learn more about \"Learning About High Cholesterol. \"     If you do not have an account, please click on the \"Sign Up Now\" link. Current as of: April 9, 2019  Content Version: 12.3  © 7265-0794 Healthwise, Incorporated. Care instructions adapted under license by [facility] ([facility]). If you have questions about a medical condition or this instruction, always ask your healthcare professional. [place] [address] any warranty or liability for your use of this information.

## 2020-01-10 ASSESSMENT — ENCOUNTER SYMPTOMS
RHINORRHEA: 0
SORE THROAT: 0
TROUBLE SWALLOWING: 0

## 2020-01-13 ENCOUNTER — NURSE ONLY (OUTPATIENT)
Dept: LAB | Age: 50
End: 2020-01-13

## 2020-01-13 LAB
ALBUMIN SERPL-MCNC: 4 G/DL (ref 3.5–5.1)
ALP BLD-CCNC: 42 U/L (ref 38–126)
ALT SERPL-CCNC: 40 U/L (ref 11–66)
ANION GAP SERPL CALCULATED.3IONS-SCNC: 10 MEQ/L (ref 8–16)
AST SERPL-CCNC: 25 U/L (ref 5–40)
AVERAGE GLUCOSE: 99 MG/DL (ref 70–126)
BASOPHILS # BLD: 0.7 %
BASOPHILS ABSOLUTE: 0 THOU/MM3 (ref 0–0.1)
BILIRUB SERPL-MCNC: 0.9 MG/DL (ref 0.3–1.2)
BUN BLDV-MCNC: 14 MG/DL (ref 7–22)
CALCIUM SERPL-MCNC: 9 MG/DL (ref 8.5–10.5)
CHLORIDE BLD-SCNC: 105 MEQ/L (ref 98–111)
CHOLESTEROL, TOTAL: 147 MG/DL (ref 100–199)
CO2: 26 MEQ/L (ref 23–33)
CREAT SERPL-MCNC: 0.9 MG/DL (ref 0.4–1.2)
EOSINOPHIL # BLD: 1.8 %
EOSINOPHILS ABSOLUTE: 0.1 THOU/MM3 (ref 0–0.4)
ERYTHROCYTE [DISTWIDTH] IN BLOOD BY AUTOMATED COUNT: 11.9 % (ref 11.5–14.5)
ERYTHROCYTE [DISTWIDTH] IN BLOOD BY AUTOMATED COUNT: 39.8 FL (ref 35–45)
GFR SERPL CREATININE-BSD FRML MDRD: > 90 ML/MIN/1.73M2
GLUCOSE BLD-MCNC: 108 MG/DL (ref 70–108)
HBA1C MFR BLD: 5.3 % (ref 4.4–6.4)
HCT VFR BLD CALC: 45.7 % (ref 42–52)
HDLC SERPL-MCNC: 35 MG/DL
HEMOGLOBIN: 15.8 GM/DL (ref 14–18)
IMMATURE GRANS (ABS): 0.02 THOU/MM3 (ref 0–0.07)
IMMATURE GRANULOCYTES: 0.3 %
LDL CHOLESTEROL CALCULATED: 61 MG/DL
LYMPHOCYTES # BLD: 22.7 %
LYMPHOCYTES ABSOLUTE: 1.4 THOU/MM3 (ref 1–4.8)
MCH RBC QN AUTO: 31.7 PG (ref 26–33)
MCHC RBC AUTO-ENTMCNC: 34.6 GM/DL (ref 32.2–35.5)
MCV RBC AUTO: 91.6 FL (ref 80–94)
MONOCYTES # BLD: 8.7 %
MONOCYTES ABSOLUTE: 0.5 THOU/MM3 (ref 0.4–1.3)
NUCLEATED RED BLOOD CELLS: 0 /100 WBC
PLATELET # BLD: 299 THOU/MM3 (ref 130–400)
PMV BLD AUTO: 9.7 FL (ref 9.4–12.4)
POTASSIUM SERPL-SCNC: 4.4 MEQ/L (ref 3.5–5.2)
RBC # BLD: 4.99 MILL/MM3 (ref 4.7–6.1)
SEG NEUTROPHILS: 65.8 %
SEGMENTED NEUTROPHILS ABSOLUTE COUNT: 3.9 THOU/MM3 (ref 1.8–7.7)
SODIUM BLD-SCNC: 141 MEQ/L (ref 135–145)
TOTAL PROTEIN: 6.1 G/DL (ref 6.1–8)
TRIGL SERPL-MCNC: 256 MG/DL (ref 0–199)
WBC # BLD: 6 THOU/MM3 (ref 4.8–10.8)

## 2020-01-15 ENCOUNTER — TELEPHONE (OUTPATIENT)
Dept: FAMILY MEDICINE CLINIC | Age: 50
End: 2020-01-15

## 2020-01-15 NOTE — TELEPHONE ENCOUNTER
Left message to call back  Pt seen 1/9/20        ----- Message from SANDRA Mg - CNP sent at 1/14/2020  7:40 AM EST -----  Please call pt and see how his LLQ abdominal pain is doing. We can let him know that his blood work was normal, except for elevated triglycerides at 256. He can continue to work on a heart healthy diet, and add fish oil supplement to help lower triglycerides.   Thanks -    Notes recorded by Annie Arteaga MD on 1/13/2020 at 4:56 PM EST  Call pt- HGA1C ok.    Other labs to be addressed per WS.  ES

## 2020-04-14 NOTE — TELEPHONE ENCOUNTER
Flex Ayala called requesting a refill on the following medications:  Requested Prescriptions     Pending Prescriptions Disp Refills    atorvastatin (LIPITOR) 40 MG tablet 90 tablet 3     Sig: Take 1 tablet by mouth daily     Pharmacy verified: [facility]  . pv      Date of last visit: 1/9  Date of next visit (if applicable): Visit date not found

## 2020-04-15 RX ORDER — ATORVASTATIN CALCIUM 40 MG/1
40 TABLET, FILM COATED ORAL DAILY
Qty: 90 TABLET | Refills: 3 | OUTPATIENT
Start: 2020-04-15

## 2020-05-18 ENCOUNTER — TELEPHONE (OUTPATIENT)
Dept: FAMILY MEDICINE CLINIC | Age: 50
End: 2020-05-18

## 2020-05-18 NOTE — TELEPHONE ENCOUNTER
Patient called in asking if he could please get an order for an anitbody test.    He had symptoms back in March. He is not symptomatic right now. Please advise.

## 2020-06-25 ENCOUNTER — E-VISIT (OUTPATIENT)
Dept: FAMILY MEDICINE CLINIC | Age: 50
End: 2020-06-25
Payer: COMMERCIAL

## 2020-06-25 ENCOUNTER — TELEPHONE (OUTPATIENT)
Dept: FAMILY MEDICINE CLINIC | Age: 50
End: 2020-06-25

## 2020-06-25 PROCEDURE — 99421 OL DIG E/M SVC 5-10 MIN: CPT | Performed by: FAMILY MEDICINE

## 2020-06-25 RX ORDER — CEPHALEXIN 500 MG/1
500 CAPSULE ORAL 3 TIMES DAILY
Qty: 30 CAPSULE | Refills: 0 | Status: SHIPPED | OUTPATIENT
Start: 2020-06-25 | End: 2020-07-06 | Stop reason: SDUPTHER

## 2020-06-25 NOTE — PROGRESS NOTES
Lab Results   Component Value Date     01/13/2020    K 4.4 01/13/2020     01/13/2020    CO2 26 01/13/2020    BUN 14 01/13/2020    CREATININE 0.9 01/13/2020    GLUCOSE 108 01/13/2020    GLUCOSE 112 01/29/2018    CALCIUM 9.0 01/13/2020        Allergies   Allergen Reactions    Pcn [Penicillins] Hives         Current Outpatient Medications:     cephALEXin (KEFLEX) 500 MG capsule, Take 1 capsule by mouth 3 times daily for 10 days, Disp: 30 capsule, Rfl: 0    atorvastatin (LIPITOR) 40 MG tablet, Take 1 tablet by mouth daily, Disp: 90 tablet, Rfl: 3    Coenzyme Q10 (CO Q-10) 200 MG CAPS, Take 200 mg by mouth daily, Disp: 30 capsule, Rfl: 11      Notes/ symptoms/ Photos  reviewed. Previous notes/ office visits reviewed. Allergies, current meds, and pertinent recent labs reviewed. Pt with long-standing PCN allergy, which predates previous treatment with Keflex (pt tolerated well). Rx for Keflex 500 mg- 1 pill 3x/day for 10 days sent to pharmacy and pt notified via [address] 5-10 minutes were spent on the digital evaluation and management of this patient.          Electronically signed by Phuong Sanchez MD on 6/25/2020 at 4:48 PM

## 2020-06-25 NOTE — TELEPHONE ENCOUNTER
Spoke to the pt and notified him of ES response and recommendation to do an Evisit and he is agreeable. He will try that and call the office if he has any difficulties.

## 2020-07-05 NOTE — PROGRESS NOTES
[facility]  [address] [place] [address] Rd., [phone]  Dept: [phone]  Dept Fax: [phone]    Pavel Aldrich is a 52 y.o. male being evaluated by a Virtual Visit (video visit) encounter to address concerns as mentioned above. A caregiver was present when appropriate. Due to this being a TeleHealth encounter (During YWVEC-39 public health emergency), evaluation of the following organ systems was limited: Vitals/Constitutional/EENT/Resp/CV/GI//MS/Neuro/Skin/Heme-Lymph-Imm. Pursuant to the emergency declaration under the [address] [place] [address], [address] [place] [address] authority and the [employer] Act, this Virtual Visit was conducted with patient's (and/or legal guardian's) consent, to reduce the patient's risk of exposure to COVID-19 and provide necessary medical care. The patient (and/or legal guardian) has also been advised to contact this office for worsening conditions or problems, and seek emergency medical treatment and/or call 911 if deemed necessary. Patient identification was verified at the start of the visit: Yes    Total time spent for this encounter: >15 minutes. Services were provided through a video synchronous discussion virtually to substitute for in-person clinic visit. Patient and provider were located at their individual homes. --Jesús Lopez MD on 7/6/2020 at 12:12 PM    An electronic signature was used to authenticate this note. SUBJECTIVE     Pavel Aldrich is a 52 y.o. male    Pt complains of possible recurrent ankle cellulitis- pt started on Keflex 500 mg- 1 pill 3x/day for 10 days last week. Pt states ankle feels \"way better\", but still present. Pt denies any ankle trauma. Pt also using Ibuprofen 200 mg- 3 pills BID prn. Pt states he was helping parents clean out pond recently. Pt denies any alcohol use, meat usage prior to onset.       Pt states he drinks 6- 16 ounce bottles of water daily. Pt previously treated with antibiotics x 2 rounds in 2017- pt developed blister after wearing \"muck boots\"     Patient Active Problem List   Diagnosis    Hyperlipidemia    Elevated alanine aminotransferase (ALT) level     Current Outpatient Medications   Medication Sig Dispense Refill    cephALEXin (KEFLEX) 500 MG capsule Take 1 capsule by mouth 3 times daily for 10 days 30 capsule 0    atorvastatin (LIPITOR) 40 MG tablet Take 1 tablet by mouth daily 90 tablet 3    Coenzyme Q10 (CO Q-10) 200 MG CAPS Take 200 mg by mouth daily 30 capsule 11     No current facility-administered medications for this visit. Review of Systems   Constitutional: Negative for chills, diaphoresis, fatigue, fever and unexpected weight change. Eyes: Negative for visual disturbance. Respiratory: Negative for chest tightness and shortness of breath. Cardiovascular: Negative for chest pain, palpitations and leg swelling. Gastrointestinal: Negative for abdominal pain, anal bleeding, blood in stool, constipation, diarrhea, nausea and vomiting. Genitourinary: Negative for dysuria and hematuria. Musculoskeletal: Negative for neck pain. Neurological: Negative for dizziness, light-headedness and headaches. OBJECTIVE     Due to this being a TeleHealth encounter, evaluation of the following organ systems is limited: Vitals/Constitutional/EENT/Resp/CV/GI//MS/Neuro/Skin/Heme-Lymph-Imm. PHYSICAL EXAMINATION:  [ INSTRUCTIONS:  \"[x]\" Indicates a positive item  \"[]\" Indicates a negative item  -- DELETE ALL ITEMS NOT EXAMINED]  Vital Signs: (All Vital Signs are pt reported, unless otherwise noted)   There were no vitals taken for this visit.       Constitutional: [x] Appears well-developed and well-nourished [x] No apparent distress      [] Abnormal-   Mental status  [x] Alert and awake  [x] Oriented to person/place/time [x]Able to follow commands      Eyes:  EOM    [x]  Normal  [] Abnormal-  Sclera  [x]  Normal  []

## 2020-07-06 ENCOUNTER — VIRTUAL VISIT (OUTPATIENT)
Dept: FAMILY MEDICINE CLINIC | Age: 50
End: 2020-07-06
Payer: COMMERCIAL

## 2020-07-06 PROCEDURE — 99213 OFFICE O/P EST LOW 20 MIN: CPT | Performed by: FAMILY MEDICINE

## 2020-07-06 RX ORDER — CEPHALEXIN 500 MG/1
500 CAPSULE ORAL 3 TIMES DAILY
Qty: 30 CAPSULE | Refills: 0 | Status: SHIPPED | OUTPATIENT
Start: 2020-07-06 | End: 2020-07-16

## 2020-07-06 ASSESSMENT — ENCOUNTER SYMPTOMS
ANAL BLEEDING: 0
NAUSEA: 0
CONSTIPATION: 0
SHORTNESS OF BREATH: 0
VOMITING: 0
ABDOMINAL PAIN: 0
DIARRHEA: 0
BLOOD IN STOOL: 0
CHEST TIGHTNESS: 0

## 2020-07-06 NOTE — PATIENT INSTRUCTIONS
After discussion with pt, will complete another round of Keflex 500 mg- 1 pill 3x/day for 10 days. #30/ no refills. Encouraged daily Probiotic for GI protection. OK to continue Ibuprofen 200 mg- 3 pills 3x/day as needed for pain and swelling. Continue to stay well hydrated. Further management pending response to above treatment.

## 2020-07-07 ENCOUNTER — TELEPHONE (OUTPATIENT)
Dept: FAMILY MEDICINE CLINIC | Age: 50
End: 2020-07-07

## 2020-07-07 NOTE — TELEPHONE ENCOUNTER
Start Keflex again as previously directed. Elevate feet above level of heart 20 minutes 3x/day to help with swelling. Keep us updated for any worsening symptoms. To ED if concerns for DVT. Appt in office if needed- WS/ TS ok also.  ES

## 2020-07-07 NOTE — TELEPHONE ENCOUNTER
Pt calls in stating that this morning he woke up with right foot swelling and pain in his second toe. He states that the pain and swelling is mostly near his 2nd toe. He states that he did not start his keflex that was given yesterday until this morning. Pt does state that he recently has gained about 10 lbs in the last couple of months and feels this may be why he has cellulitis as well as wearing sandals. Pt denied feeling this may be gout and wants to know if ES has any new recommendations. He did state he feels relief in pain typically within the day when starting his anitbiotic but takes a few days for the swelling to get better.        [address] if needed

## 2020-07-18 ENCOUNTER — TELEPHONE (OUTPATIENT)
Dept: FAMILY MEDICINE CLINIC | Age: 50
End: 2020-07-18

## 2020-07-18 NOTE — TELEPHONE ENCOUNTER
Pt called at 9:06 AM on 07/18/2020 due to recurring cellulitis. Just finished second course of keflex with improvement but now sxs are returning. Is currently OOT on vacation. REquests more keflex. Called in keflex 500 mg TID x 10 days to [facility].   Electronically signed by Shayy Daniel MD on 7/18/2020 at 10:51 AM

## 2020-08-05 NOTE — PROGRESS NOTES
[facility]  [address] [place] [address] Rd., [id number]  Dept: [phone]  Dept Fax: [phone]    SUBJECTIVE     Srikanth Hinds is a 52 y.o.male    Pt presents for follow up of Left foot pain. Pt feeling ok since last visit- interval history and any new issues noted below:     First course of Keflex prescribed 6/25/2020. Completed a second 10 day course of Keflex from 7/6-7/16/2020 for cellulitis. He is not diabetic. He is not a smoker. Now thinks he has cellulitis at the top of right foot. Wondering if he has gout however pain improves greatly with abx. Foot pain today: Noticed today in the shower across the top of toes of right foot. Has not been able to play soccer with son due to pain. Rates pain about 2/10. Described as dull pain, feels like bruising. No radiation. He states his feet have caused pain since he walked in [facility] in mid-June. Has taken walks in fitted shoes. Less pain when wearing slip-ons. He states his pain is \"overall 85% better. \"    Denies fever, chills, nausea, vomiting. Denies pain that has been so severe that he could not walk. Wt Readings from Last 3 Encounters:   08/06/20 193 lb (87.5 kg)   01/09/20 198 lb 12.8 oz (90.2 kg)   02/04/19 197 lb 3.2 oz (89.4 kg)     Weight decreased 5# since last visit 7 months ago. Patient Active Problem List   Diagnosis    Hyperlipidemia    Elevated alanine aminotransferase (ALT) level       Current Outpatient Medications   Medication Sig Dispense Refill    atorvastatin (LIPITOR) 40 MG tablet Take 1 tablet by mouth daily 90 tablet 3    Coenzyme Q10 (CO Q-10) 200 MG CAPS Take 200 mg by mouth daily 30 capsule 11     No current facility-administered medications for this visit. Review of Systems   Constitutional: Negative for chills, fatigue and fever. HENT: Negative for ear pain, postnasal drip and trouble swallowing. Eyes: Negative for pain and visual disturbance.    Respiratory: Negative for cough and shortness of breath. Cardiovascular: Negative for chest pain and palpitations. Gastrointestinal: Negative for abdominal pain, blood in stool, constipation, diarrhea, nausea and vomiting. Musculoskeletal: Negative for arthralgias, gait problem, joint swelling and myalgias. Complains of pain across the top of toes of right foot. Skin: Negative for color change, pallor, rash and wound. Neurological: Negative for dizziness, syncope, light-headedness and headaches. Hematological: Does not bruise/bleed easily. OBJECTIVE     /80 (Site: Left Upper Arm)   Pulse 68   Temp 97.1 °F (36.2 °C) (Temporal)   Resp 16   Wt 193 lb (87.5 kg)   BMI 28.50 kg/m²   Body mass index is 28.5 kg/m². BP Readings from Last 3 Encounters:   08/06/20 106/80   01/09/20 118/78   02/04/19 114/80     Physical Exam  Vitals signs and nursing note reviewed. Constitutional:       General: He is not in acute distress. Appearance: He is well-developed. He is not diaphoretic. HENT:      Head: Normocephalic and atraumatic. Right Ear: External ear normal.      Left Ear: External ear normal.      Nose: Nose normal.   Eyes:      General: No scleral icterus. Right eye: No discharge. Left eye: No discharge. Conjunctiva/sclera: Conjunctivae normal.   Neck:      Musculoskeletal: Normal range of motion. Cardiovascular:      Rate and Rhythm: Normal rate and regular rhythm. Heart sounds: Normal heart sounds. No murmur. Pulmonary:      Effort: Pulmonary effort is normal.      Breath sounds: Normal breath sounds. Musculoskeletal: Normal range of motion. General: No swelling, tenderness, deformity or signs of injury. Right lower leg: No edema. Left lower leg: No edema. Comments: No tenderness to palpation of either foot including toes and arches. Both feet and legs are nonerythematous and nonedematous. No lesions or lacerations.  Intact strength, ROM, and sensation of both feet. 2+ dorsalis pedis pulses bilaterally. Skin:     General: Skin is warm and dry. Capillary Refill: Capillary refill takes less than 2 seconds. Findings: No erythema or rash. Comments: Mild orange discoloration of distal toenails without color change at bases of nails. Hair is present on toes. Neurological:      General: No focal deficit present. Mental Status: He is alert and oriented to person, place, and time. Cranial Nerves: No cranial nerve deficit. Motor: No weakness. Coordination: Coordination normal.      Gait: Gait normal.   Psychiatric:         Behavior: Behavior normal.         Thought Content: Thought content normal.         Judgment: Judgment normal.       Future Appointments   Date Time Provider Tila Zhao   2/10/2021  8:45 AM Jinny Hood MD [address] [facility]       ASSESSMENT       Diagnosis Orders   1. Metatarsalgia of right foot  External Referral To Podiatry       PLAN      No indications for repeat antibiotics at this time. Referral to podiatry to further evaluation and management of right foot pain. OK to continue Ibuprofen 200 mg- 3 pills 3x/day as needed for pain and swelling. Take ibuprofen with food to avoid GI upset. May consider physical therapy for the right foot if pain persists. Follow up in 6 months for annual PE.            Electronically signed by Jinny Hood MD on 8/7/2020 at 11:28 AM

## 2020-08-06 ENCOUNTER — OFFICE VISIT (OUTPATIENT)
Dept: FAMILY MEDICINE CLINIC | Age: 50
End: 2020-08-06
Payer: COMMERCIAL

## 2020-08-06 VITALS
HEART RATE: 68 BPM | BODY MASS INDEX: 28.5 KG/M2 | TEMPERATURE: 97.1 F | SYSTOLIC BLOOD PRESSURE: 106 MMHG | RESPIRATION RATE: 16 BRPM | WEIGHT: 193 LBS | DIASTOLIC BLOOD PRESSURE: 80 MMHG

## 2020-08-06 PROCEDURE — 99213 OFFICE O/P EST LOW 20 MIN: CPT | Performed by: FAMILY MEDICINE

## 2020-08-06 ASSESSMENT — ENCOUNTER SYMPTOMS
NAUSEA: 0
TROUBLE SWALLOWING: 0
SHORTNESS OF BREATH: 0
BLOOD IN STOOL: 0
DIARRHEA: 0
ABDOMINAL PAIN: 0
COLOR CHANGE: 0
CONSTIPATION: 0
EYE PAIN: 0
COUGH: 0
VOMITING: 0

## 2020-08-06 NOTE — PATIENT INSTRUCTIONS
No indications for repeat antibiotics at this time. Referral to podiatry to further evaluation and management of right foot pain. OK to continue Ibuprofen 200 mg- 3 pills 3x/day as needed for pain and swelling. Take ibuprofen with food to avoid GI upset. May consider physical therapy for the right foot if pain persists. Follow up in 6 months for annual PE. Patient Education        Metatarsalgia: Care Instructions  Your Care Instructions     Metatarsalgia (say \"met-misti-tar-SAL-mesfin-misti\") is pain in the ball of the foot. It sometimes spreads to the toes. The ball of the foot is the bottom of the foot, where the toes join the foot. While walking might be very painful, the pain is usually not a sign of a serious or permanent problem. But any pain can affect your life, so it is important that you treat it. Pain in this area can be caused by many things. For example, you may have this pain if you stand or walk a lot or wear tight shoes or high heels. Your pain might be caused by inflammation of a joint (capsulitis). It is most common in the joint at the base of the second toe, near the ball of the foot. Capsulitis happens when ligaments that go around the joint become inflamed. The joint may be swollen. It may feel like there is a small rock under it. You may have had an X-ray if your doctor wanted to make sure a more serious problem is not causing your pain. Treatment may consist of home care, such as rest, wearing different shoes, and taking over-the-counter pain medicines. It can take months for the pain to go away. If the ligaments around a joint are torn, or if a toe has started to slant toward the toe next to it, you may need surgery. Follow-up care is a key part of your treatment and safety. Be sure to make and go to all appointments, and call your doctor if you are having problems. It's also a good idea to know your test results and keep a list of the medicines you take.   How can you care for

## 2020-09-18 ENCOUNTER — APPOINTMENT (OUTPATIENT)
Dept: CT IMAGING | Age: 50
End: 2020-09-18
Payer: COMMERCIAL

## 2020-09-18 ENCOUNTER — HOSPITAL ENCOUNTER (EMERGENCY)
Age: 50
Discharge: HOME | End: 2020-09-18
Payer: COMMERCIAL

## 2020-09-18 VITALS
SYSTOLIC BLOOD PRESSURE: 161 MMHG | DIASTOLIC BLOOD PRESSURE: 113 MMHG | TEMPERATURE: 97.8 F | BODY MASS INDEX: 28.5 KG/M2 | OXYGEN SATURATION: 100 % | RESPIRATION RATE: 22 BRPM | WEIGHT: 193 LBS | HEART RATE: 62 BPM

## 2020-09-18 LAB
ANION GAP SERPL CALCULATED.3IONS-SCNC: 10 MEQ/L (ref 8–16)
BASOPHILS # BLD: 0.1 %
BASOPHILS ABSOLUTE: 0 THOU/MM3 (ref 0–0.1)
BUN BLDV-MCNC: 23 MG/DL (ref 7–22)
CALCIUM SERPL-MCNC: 9.4 MG/DL (ref 8.5–10.5)
CHLORIDE BLD-SCNC: 105 MEQ/L (ref 98–111)
CO2: 26 MEQ/L (ref 23–33)
CREAT SERPL-MCNC: 1.1 MG/DL (ref 0.4–1.2)
EOSINOPHIL # BLD: 0.9 %
EOSINOPHILS ABSOLUTE: 0.1 THOU/MM3 (ref 0–0.4)
ERYTHROCYTE [DISTWIDTH] IN BLOOD BY AUTOMATED COUNT: 11.9 % (ref 11.5–14.5)
ERYTHROCYTE [DISTWIDTH] IN BLOOD BY AUTOMATED COUNT: 40.2 FL (ref 35–45)
GFR SERPL CREATININE-BSD FRML MDRD: 71 ML/MIN/1.73M2
GLUCOSE BLD-MCNC: 118 MG/DL (ref 70–108)
HCT VFR BLD CALC: 47.9 % (ref 42–52)
HEMOGLOBIN: 16.5 GM/DL (ref 14–18)
IMMATURE GRANS (ABS): 0.06 THOU/MM3 (ref 0–0.07)
IMMATURE GRANULOCYTES: 0.4 %
LYMPHOCYTES # BLD: 6.9 %
LYMPHOCYTES ABSOLUTE: 0.9 THOU/MM3 (ref 1–4.8)
MCH RBC QN AUTO: 31.9 PG (ref 26–33)
MCHC RBC AUTO-ENTMCNC: 34.4 GM/DL (ref 32.2–35.5)
MCV RBC AUTO: 92.5 FL (ref 80–94)
MONOCYTES # BLD: 5 %
MONOCYTES ABSOLUTE: 0.7 THOU/MM3 (ref 0.4–1.3)
NUCLEATED RED BLOOD CELLS: 0 /100 WBC
OSMOLALITY CALCULATION: 286 MOSMOL/KG (ref 275–300)
PLATELET # BLD: 305 THOU/MM3 (ref 130–400)
PMV BLD AUTO: 9.5 FL (ref 9.4–12.4)
POTASSIUM SERPL-SCNC: 4.3 MEQ/L (ref 3.5–5.2)
RBC # BLD: 5.18 MILL/MM3 (ref 4.7–6.1)
SEG NEUTROPHILS: 86.7 %
SEGMENTED NEUTROPHILS ABSOLUTE COUNT: 11.7 THOU/MM3 (ref 1.8–7.7)
SODIUM BLD-SCNC: 141 MEQ/L (ref 135–145)
WBC # BLD: 13.5 THOU/MM3 (ref 4.8–10.8)

## 2020-09-18 PROCEDURE — 74176 CT ABD & PELVIS W/O CONTRAST: CPT

## 2020-09-18 PROCEDURE — 96375 TX/PRO/DX INJ NEW DRUG ADDON: CPT

## 2020-09-18 PROCEDURE — 96374 THER/PROPH/DIAG INJ IV PUSH: CPT

## 2020-09-18 PROCEDURE — 80048 BASIC METABOLIC PNL TOTAL CA: CPT

## 2020-09-18 PROCEDURE — 99282 EMERGENCY DEPT VISIT SF MDM: CPT

## 2020-09-18 PROCEDURE — 85025 COMPLETE CBC W/AUTO DIFF WBC: CPT

## 2020-09-18 PROCEDURE — 36415 COLL VENOUS BLD VENIPUNCTURE: CPT

## 2020-09-18 PROCEDURE — 6360000002 HC RX W HCPCS

## 2020-09-18 PROCEDURE — 99283 EMERGENCY DEPT VISIT LOW MDM: CPT

## 2020-09-18 PROCEDURE — 6370000000 HC RX 637 (ALT 250 FOR IP): Performed by: NURSE PRACTITIONER

## 2020-09-18 RX ORDER — KETOROLAC TROMETHAMINE 30 MG/ML
30 INJECTION, SOLUTION INTRAMUSCULAR; INTRAVENOUS ONCE
Status: COMPLETED | OUTPATIENT
Start: 2020-09-18 | End: 2020-09-18

## 2020-09-18 RX ORDER — TAMSULOSIN HYDROCHLORIDE 0.4 MG/1
0.4 CAPSULE ORAL DAILY
Qty: 5 CAPSULE | Refills: 0 | Status: SHIPPED | OUTPATIENT
Start: 2020-09-18 | End: 2020-09-23 | Stop reason: SDUPTHER

## 2020-09-18 RX ORDER — ONDANSETRON 2 MG/ML
INJECTION INTRAMUSCULAR; INTRAVENOUS
Status: COMPLETED
Start: 2020-09-18 | End: 2020-09-18

## 2020-09-18 RX ORDER — TAMSULOSIN HYDROCHLORIDE 0.4 MG/1
0.4 CAPSULE ORAL DAILY
Status: DISCONTINUED | OUTPATIENT
Start: 2020-09-18 | End: 2020-09-18 | Stop reason: HOSPADM

## 2020-09-18 RX ORDER — HYDROCODONE BITARTRATE AND ACETAMINOPHEN 5; 325 MG/1; MG/1
1 TABLET ORAL EVERY 6 HOURS PRN
Qty: 12 TABLET | Refills: 0 | Status: SHIPPED | OUTPATIENT
Start: 2020-09-18 | End: 2020-09-21

## 2020-09-18 RX ORDER — KETOROLAC TROMETHAMINE 30 MG/ML
INJECTION, SOLUTION INTRAMUSCULAR; INTRAVENOUS
Status: COMPLETED
Start: 2020-09-18 | End: 2020-09-18

## 2020-09-18 RX ORDER — ONDANSETRON 2 MG/ML
4 INJECTION INTRAMUSCULAR; INTRAVENOUS ONCE
Status: COMPLETED | OUTPATIENT
Start: 2020-09-18 | End: 2020-09-18

## 2020-09-18 RX ORDER — KETOROLAC TROMETHAMINE 10 MG/1
10 TABLET, FILM COATED ORAL EVERY 6 HOURS PRN
Qty: 20 TABLET | Refills: 0 | Status: SHIPPED | OUTPATIENT
Start: 2020-09-18 | End: 2021-02-10

## 2020-09-18 RX ADMIN — TAMSULOSIN HYDROCHLORIDE 0.4 MG: 0.4 CAPSULE ORAL at 11:53

## 2020-09-18 RX ADMIN — ONDANSETRON 4 MG: 2 INJECTION INTRAMUSCULAR; INTRAVENOUS at 11:17

## 2020-09-18 RX ADMIN — KETOROLAC TROMETHAMINE 30 MG: 30 INJECTION, SOLUTION INTRAMUSCULAR; INTRAVENOUS at 11:17

## 2020-09-18 RX ADMIN — KETOROLAC TROMETHAMINE 30 MG: 30 INJECTION, SOLUTION INTRAMUSCULAR at 11:17

## 2020-09-18 ASSESSMENT — ENCOUNTER SYMPTOMS
VOMITING: 0
COUGH: 0
DIARRHEA: 0
CONSTIPATION: 0
ABDOMINAL DISTENTION: 0
CHOKING: 0
BACK PAIN: 1
CHEST TIGHTNESS: 0
COLOR CHANGE: 0
NAUSEA: 1
SHORTNESS OF BREATH: 0
ABDOMINAL PAIN: 0

## 2020-09-18 ASSESSMENT — PAIN DESCRIPTION - LOCATION: LOCATION: FLANK

## 2020-09-18 ASSESSMENT — PAIN DESCRIPTION - PAIN TYPE: TYPE: ACUTE PAIN

## 2020-09-18 ASSESSMENT — PAIN SCALES - GENERAL: PAINLEVEL_OUTOF10: 9

## 2020-09-18 ASSESSMENT — PAIN DESCRIPTION - ORIENTATION: ORIENTATION: LEFT

## 2020-09-18 NOTE — ED NOTES
Patient transported to Radiology department via [facility] in stable condition.        Braeden Bradley RN  09/18/20 [id number]

## 2020-09-18 NOTE — ED PROVIDER NOTES
[facility] [address] [place] [address]       Chief Complaint   Patient presents with    Flank Pain     left       Nurses Notes reviewed and I agree except as noted in the HPI. HISTORY OF PRESENT ILLNESS    Cece Swann dillon 52 y.o. male who presents to the ED for evaluation of flank pain. Patient states he has left flank pain, he noted initially on September 4, it lasted for approximately 3 days. He was asymptomatic for about a week, and then it came back. He noted this last Wednesday was the worst.  He had its increased today. He denies any pain with range of motion. He notes pain is sharp, laying still makes it worse, can get comfortable, comes in waves. He denies any chest pain or shortness of breath. He denies any diarrhea or constipation. He notes some nausea today with pain. Denies any vomiting. He denies fevers or chills, denies dysuria or frequency. He has been taking ibuprofen with little relief. He notes that approximately 10 years ago he was urinating and he passed the stone once, but he is never been formally diagnosed with nephrolithiasis. He denies any significant medical problems. HPI was provided by the patient. REVIEW OF SYSTEMS     Review of Systems   Constitutional: Negative for activity change, appetite change, chills and fever. Respiratory: Negative for cough, choking, chest tightness and shortness of breath. Cardiovascular: Negative for chest pain and leg swelling. Gastrointestinal: Positive for nausea. Negative for abdominal distention, abdominal pain, constipation, diarrhea and vomiting. Endocrine: Negative for polyuria. Genitourinary: Positive for flank pain. Negative for decreased urine volume, difficulty urinating, dysuria, frequency, hematuria, scrotal swelling and testicular pain. Musculoskeletal: Positive for back pain. Negative for arthralgias, gait problem, joint swelling and myalgias. Skin: Negative for color change and rash. Allergic/Immunologic: Negative for immunocompromised state. Neurological: Negative for dizziness, weakness, light-headedness and headaches. Hematological: Negative for adenopathy. Psychiatric/Behavioral: Negative for agitation, behavioral problems and confusion. PAST MEDICAL HISTORY     Past Medical History:   Diagnosis Date    Headache(784.0)     Hyperlipidemia        SURGICALHISTORY      has a past surgical history that includes lipoma resection and Vasectomy. CURRENT MEDICATIONS       Discharge Medication List as of 2020 11:50 AM      CONTINUE these medications which have NOT CHANGED    Details   atorvastatin (LIPITOR) 40 MG tablet Take 1 tablet by mouth daily, Disp-90 tablet, R-3Normal      Coenzyme Q10 (CO Q-10) 200 MG CAPS Take 200 mg by mouth daily, Disp-30 capsule,R-11Normal             ALLERGIES     is allergic to pcn [penicillins]. FAMILY HISTORY     He indicated that his mother is alive. He indicated that his father is . He indicated that his maternal grandmother is . He indicated that his maternal grandfather is . He indicated that his paternal grandmother is . He indicated that his paternal grandfather is . family history includes Cancer (age of onset: 61) in his father; Heart Disease in his paternal grandfather. SOCIAL HISTORY       Social History     Socioeconomic History    Marital status:      Spouse name: Not on file    Number of children: Not on file    Years of education: Not on file    Highest education level: Not on file   Occupational History    Not on file   Social Needs    Financial resource strain: Not on file    Food insecurity     Worry: Not on file     Inability: Not on file    Transportation needs     Medical: Not on file     Non-medical: Not on file   Tobacco Use    Smoking status: Never Smoker    Smokeless tobacco: Former User   Substance and Sexual Activity    Alcohol use: No    Drug use:  No  Sexual activity: Yes     Partners: Female     Comment: - monogamous   Lifestyle    Physical activity     Days per week: Not on file     Minutes per session: Not on file    Stress: Not on file   Relationships    Social connections     Talks on phone: Not on file     Gets together: Not on file     Attends Protestant service: Not on file     Active member of club or organization: Not on file     Attends meetings of clubs or organizations: Not on file     Relationship status: Not on file    Intimate partner violence     Fear of current or ex partner: Not on file     Emotionally abused: Not on file     Physically abused: Not on file     Forced sexual activity: Not on file   Other Topics Concern    Not on file   Social History Narrative    Not on file       PHYSICAL EXAM     INITIAL VITALS:  weight is 193 lb (87.5 kg). His oral temperature is 97.8 °F (36.6 °C). His blood pressure is 161/113 (abnormal) and his pulse is 62. His respiration is 22 and oxygen saturation is 100%. Physical Exam  Vitals signs and nursing note reviewed. Constitutional:       Appearance: Normal appearance. He is well-developed. HENT:      Head: Normocephalic. Right Ear: External ear normal.      Left Ear: External ear normal.      Nose: Nose normal.      Mouth/Throat:      Pharynx: Uvula midline. Eyes:      Conjunctiva/sclera: Conjunctivae normal.   Neck:      Musculoskeletal: Normal range of motion and neck supple. Cardiovascular:      Rate and Rhythm: Normal rate and regular rhythm. Heart sounds: Normal heart sounds, S1 normal and S2 normal.   Pulmonary:      Effort: Pulmonary effort is normal. No respiratory distress. Breath sounds: Normal breath sounds. Chest:      Chest wall: No tenderness. Abdominal:      General: Bowel sounds are normal. There is no distension. Palpations: Abdomen is soft. Tenderness: There is no abdominal tenderness.  There is no right CVA tenderness, left CVA tenderness, guarding or rebound. Negative signs include Rovsing's sign, McBurney's sign and psoas sign. Musculoskeletal: Normal range of motion. Skin:     General: Skin is warm and dry. Coloration: Skin is not pale. Findings: No erythema or rash. Neurological:      Mental Status: He is alert and oriented to person, place, and time. Psychiatric:         Behavior: Behavior normal.         Thought Content: Thought content normal.         Judgment: Judgment normal.         DIFFERENTIAL DIAGNOSIS:   Pyelonephritis, nephrolithiasis, cystitis, diverticulitis, epididymitis    DIAGNOSTIC RESULTS       RADIOLOGY: non-plainfilm images(s) such as CT, Ultrasound and MRI are read by the radiologist.  Plain radiographic images are visualized and preliminarily interpreted by the emergency physician unless otherwise stated below. CT ABDOMEN PELVIS WO CONTRAST Additional Contrast? None   Final Result       1. 5 mm calculus in the mid left ureter causing moderate left hydronephrosis. 2. Bilateral nephrolithiasis. **This report has been created using voice recognition software. It may contain minor errors which are inherent in voice recognition technology. **      Final report electronically signed by Dr. Vladimir Zeng on 9/18/2020 11:42 AM            LABS:   Labs Reviewed   CBC WITH AUTO DIFFERENTIAL - Abnormal; Notable for the following components:       Result Value    WBC 13.5 (*)     Segs Absolute 11.7 (*)     Lymphocytes Absolute 0.9 (*)     All other components within normal limits   BASIC METABOLIC PANEL - Abnormal; Notable for the following components:    Glucose 118 (*)     BUN 23 (*)     All other components within normal limits   GLOMERULAR FILTRATION RATE, ESTIMATED - Abnormal; Notable for the following components:    Est, Glom Filt Rate 71 (*)     All other components within normal limits   ANION GAP   OSMOLALITY   URINALYSIS WITH MICROSCOPIC       EMERGENCY DEPARTMENT COURSE:   Vitals: Vitals:    09/18/20 1107   BP: (!) 161/113   Pulse: 62   Resp: 22   Temp: 97.8 °F (36.6 °C)   TempSrc: Oral   SpO2: 100%   Weight: 193 lb (87.5 kg)       MDM    Patient was seen and evaluated in the emergency department, patient appeared to be in no acute distress, vital signs were reviewed, no significant findings were noted. Physical exam was completed, he had no CVA tenderness, he had minimal left groin tenderness, he had soft abdomen, no peritoneal signs. Labs and imaging ordered. Lab work shows slight leukocytosis, no other significant findings noted. CT scan performed reveals a 5 mm calculus in the mid left ureter causing moderate left hydronephrosis. Patient was treated medications below I noted significant improvement in pain. I discussed my findings my plan of care the patient is amenable discharge. Follow-up appointment is made with urology the following Monday. He is given a prescription for Toradol, Norco, Flomax. He is advised to return the emergency department new or worsening signs and symptoms. Follow-up with urology if he has not passed a stone. Given strainer for urine. He verbalized understanding of plan of care. Medications   ketorolac (TORADOL) injection 30 mg (30 mg Intravenous Given 9/18/20 1117)   ondansetron (ZOFRAN) injection 4 mg (4 mg Intravenous Given 9/18/20 1117)       Patient was seenindependently by myself. The patient's final impression and disposition and plan was determined by myself. CRITICAL CARE:   None    CONSULTS:  None    PROCEDURES:  None    FINAL IMPRESSION     1. Ureterolithiasis    2. Renal colic          DISPOSITION/PLAN   Patient discharged in stable condition    PATIENT REFERREDTO:  [address]  [address]  [facility]  [address] [facility]  Go in 3 days  Appointment monday at [facility]:  Discharge Medication List as of 9/18/2020 11:50 AM      START taking these medications    Details   ketorolac (TORADOL) 10 MG tablet Take 1 tablet by mouth every 6 hours as needed for Pain, Disp-20 tablet,R-0Print      HYDROcodone-acetaminophen (NORCO) 5-325 MG per tablet Take 1 tablet by mouth every 6 hours as needed for Pain for up to 3 days. Intended supply: 3 days. Take lowest dose possible to manage pain, Disp-12 tablet,R-0Print      tamsulosin (FLOMAX) 0.4 MG capsule Take 1 capsule by mouth daily for 5 doses, Disp-5 capsule,R-0Print             (Please note that portions of this note were completed with a voice recognition program.  Efforts were made to edit the dictations but occasionally words are mis-transcribed.)      Provider:  I personally performed the services described in the documentation,reviewed and edited the documentation which was dictated to the scribe in my presence, and it accurately records my words and actions.     Ismael Rincon CNP 09/18/20 2:28 PM    Ismael Rincon, APRN - CNP        Italia Pellets, SANDRA - CNP  09/18/20 [id number]

## 2020-09-18 NOTE — ED NOTES
Pt standing at side of cot d/t pain. Meds administered per verbal order. Will monitor.      Mary Daniel RN  09/18/20 [id number]

## 2020-09-23 ENCOUNTER — OFFICE VISIT (OUTPATIENT)
Dept: UROLOGY | Age: 50
End: 2020-09-23
Payer: COMMERCIAL

## 2020-09-23 ENCOUNTER — TELEPHONE (OUTPATIENT)
Dept: FAMILY MEDICINE CLINIC | Age: 50
End: 2020-09-23

## 2020-09-23 VITALS — TEMPERATURE: 97.1 F | HEIGHT: 69 IN | BODY MASS INDEX: 28.58 KG/M2 | WEIGHT: 193 LBS

## 2020-09-23 LAB
BILIRUBIN URINE: NEGATIVE
BLOOD URINE, POC: NORMAL
CHARACTER, URINE: CLEAR
COLOR, URINE: YELLOW
GLUCOSE URINE: NEGATIVE MG/DL
KETONES, URINE: NEGATIVE
LEUKOCYTE CLUMPS, URINE: NEGATIVE
NITRITE, URINE: NEGATIVE
PH, URINE: 7 (ref 5–9)
PROTEIN, URINE: NEGATIVE MG/DL
SPECIFIC GRAVITY, URINE: >= 1.03 (ref 1–1.03)
UROBILINOGEN, URINE: 0.2 EU/DL (ref 0–1)

## 2020-09-23 PROCEDURE — 99204 OFFICE O/P NEW MOD 45 MIN: CPT | Performed by: NURSE PRACTITIONER

## 2020-09-23 PROCEDURE — 81003 URINALYSIS AUTO W/O SCOPE: CPT | Performed by: NURSE PRACTITIONER

## 2020-09-23 RX ORDER — TAMSULOSIN HYDROCHLORIDE 0.4 MG/1
0.4 CAPSULE ORAL DAILY
Qty: 10 CAPSULE | Refills: 0 | Status: SHIPPED | OUTPATIENT
Start: 2020-09-23 | End: 2020-10-07 | Stop reason: SDUPTHER

## 2020-09-23 NOTE — PROGRESS NOTES
Kian Adamson is a 52 y.o. male is a new patient who was referred here by [facility] ED. Kian Adamson was seen in the ED on 9/18/2020 due to left flank pain. The pain started suddenly that day. It was severe. It is located in the left flank and is constant. It is described as a stabbing sensation. There are no aggravating factors. There is also associated nausea & vomiting. He denies fever or chills. CT abd/pelvis showed 5 mm stone in mid left ureter with moderate left hydronephrosis. Also has bilateral nonobstructing small stones. CRT 1.1, WBC 13.5. Their pain was controlled so they were discharged with pain medication and Flomax. They deny dysuria, hematuria, fever, or chills. He reports having left sided back pain 3 weeks ago, thought he slept wrong. Pain was intermittent since that time until Friday when it became more severe. He has only needed Toradol on occasion since his ER visit. He is pushing fluids. He denies dysuria or hematuria. Reports urine is malodorous. He is taking Flomax. Has not seen or felt stone pass. Pain is located in left flank and left groin now. He reports passing a stone 10 years ago. His brother has history of kidney stones. Past Medical History:   Diagnosis Date    Headache(784.0)     Hyperlipidemia     Kidney stones 2020    around 2010 had stones        Past Surgical History:   Procedure Laterality Date    LIPOMA RESECTION      head/back    VASECTOMY         Current Outpatient Medications on File Prior to Visit   Medication Sig Dispense Refill    ketorolac (TORADOL) 10 MG tablet Take 1 tablet by mouth every 6 hours as needed for Pain 20 tablet 0    atorvastatin (LIPITOR) 40 MG tablet Take 1 tablet by mouth daily 90 tablet 3    Coenzyme Q10 (CO Q-10) 200 MG CAPS Take 200 mg by mouth daily 30 capsule 11     No current facility-administered medications on file prior to visit.         Allergies   Allergen Reactions    Pcn [Penicillins] Hives       Social History Tobacco Use    Smoking status: Never Smoker    Smokeless tobacco: Former User   Substance Use Topics    Alcohol use: No    Drug use: No       Family History   Problem Relation Age of Onset    Cancer Father 61        pancreatic cancer    Heart Disease Paternal Grandfather        Review of Systems   Constitutional: Negative for chills, fatigue, fever or weight loss. Eyes: Denies reported visual changes. Cardiovascular: Negative for chest pain, palpitations, tachycardia or edema. Respiratory: Denies cough or SOB. GI: Denies any constipation or diarrhea. Nausea and vomiting on Friday  : see HPI. Musculoskeletal: Patient denies low back pain or painful or reduced range of  motion of the back or extremities. Neurological: The patient denies any symptoms of neurological impairment or TIA's; no history of stroke. Lymphatic: Denies swollen glands in neck, axillary or inguinal areas. Psychiatric: Denies anxiety or depression. Skin: Denies rash or lesions. The remainder of the complete ROS is negative. Physical Exam  Nursing note and vitals reviewed. Constitutional: Alert and oriented times 3, no acute distress and cooperative to examination with appropriate mood and affect. HENT:   Head:        Normocephalic and atraumatic. Mouth/Throat:         Mucous membranes are normal.   Eyes:         EOM are normal. No scleral icterus. PERRLA. Neck:        Supple, symmetrical, trachea midline, no adenopathy, thyroid symmetric, not enlarged and no tenderness. Cardiovascular:        Normal rate, regular rhythm, S1 S2 heart sounds. No murmurs, rub, or gallops. Pulmonary/Chest:  Normal respiratory rate and rhthym. No use of accessory muscles. Abdominal:         Soft. No tenderness. No rebound or guarding. No CVA tenderness. Musculoskeletal:         Normal range of motion. Extremities: No cyanosis, clubbing, or edema present. Neurological:        Alert and oriented.    Skin:       Skin color, texture, turgor normal. No rashes or lesions. Psychiatric:        Normal mood and affect. Labs   WBC:    Lab Results   Component Value Date    WBC 13.5 09/18/2020     Hemoglobin/Hematocrit:    Lab Results   Component Value Date    HGB 16.5 09/18/2020    HCT 47.9 09/18/2020     BMP:    Lab Results   Component Value Date     09/18/2020    K 4.3 09/18/2020     09/18/2020    CO2 26 09/18/2020    BUN 23 09/18/2020    LABALBU 4.0 01/13/2020    CREATININE 1.1 09/18/2020    CALCIUM 9.4 09/18/2020    LABGLOM 71 09/18/2020     PT/INR:  No results found for: PROTIME, INR  PTT:  No results found for: APTT[APTT  Urinalysis:   Lab Results   Component Value Date    BLOODU Trace-intact 09/23/2020    NITRU Negative 09/23/2020    COLORU Yellow 09/23/2020     Urine Culture:  No results found for: Rehana Jay  Blood Culture:  No results found for: [facility]      Radiology  The patient has had a CT Stone Protocol which I have reviewed along with its accompanying report. The study demonstrates      1. 5 mm calculus in the mid left ureter causing moderate left hydronephrosis. 2. Bilateral nephrolithiasis.             Assessment    Left flank pain  5 mm mid ureteral stone with left sided hydronephrosis  Bilateral non-obstructing stones      Plan    Discussed stone management with patient. Pt is taking Flomax and pushing water. He denies fever or chills. He denies pain during office visit. He would like to have more time to pass stone on his own. He is not keen on surgical intervention at this time, although ESWL and Ureteroscopy were discussed with patient today. Flomax was refilled today. Pt was given strict return precautions, Fever or chills or uncontrolled pain he will go back to ER. He is agreeable to follow up in 2 weeks with Dr. Nevin Alvarez, it was recommended he get KUB. He does not wish to get any further testing, he will call if he changes his mind.        Alexandria Ricci CNP  9/23/2020 10:44 AM

## 2020-09-30 ENCOUNTER — TELEPHONE (OUTPATIENT)
Dept: UROLOGY | Age: 50
End: 2020-09-30

## 2020-09-30 NOTE — TELEPHONE ENCOUNTER
I would say its likely the stone causing the urgency and frequency. Make sure he is taking the flomax.

## 2020-09-30 NOTE — TELEPHONE ENCOUNTER
Patient does not know if he empties his bladder completely. He always has the sensation to urinate and constantly feels like he needs to void. He does not know if the stone is causing this feeling. He has urgency and frequency but has been drinking a lot of water. Hhe does not always void a large amount, it depends on how long he waits to go to the bathroom. He denies burning, fever, or chills. The urine is clear yellow. He has some pain in the lower abdomin that comes and goes rated 1 on scale of 0-10. He follows up on 10/09/2020 with Dr Hang Henry. Please advise. Thank you.

## 2020-10-07 RX ORDER — TAMSULOSIN HYDROCHLORIDE 0.4 MG/1
0.4 CAPSULE ORAL DAILY
Qty: 30 CAPSULE | Refills: 0 | Status: SHIPPED | OUTPATIENT
Start: 2020-10-07 | End: 2021-02-10

## 2020-10-07 NOTE — TELEPHONE ENCOUNTER
Pt had to r/s appt due to work conflict, asking if he can get a refill of Flomax.  Please call if refilled, as he needs to print new Good Rx coupon

## 2020-10-09 NOTE — TELEPHONE ENCOUNTER
Patient stated he would like flomax sent to [facility]. Spoke to Meeta at [facility] and gave the verbal order for tamsulosin 0.4 mg po daily dispense 30 tabs 0 refill. She voiced understanding. Spoke to Janes Mishra at [facility] and prescription was cancelled.

## 2020-11-17 ENCOUNTER — TELEPHONE (OUTPATIENT)
Dept: FAMILY MEDICINE CLINIC | Age: 50
End: 2020-11-17

## 2020-11-17 RX ORDER — CEFADROXIL 500 MG/1
500 CAPSULE ORAL 2 TIMES DAILY
Qty: 20 CAPSULE | Refills: 0 | Status: SHIPPED | OUTPATIENT
Start: 2020-11-17 | End: 2021-08-13 | Stop reason: SDUPTHER

## 2020-11-17 NOTE — TELEPHONE ENCOUNTER
OK for Duricef 500 mg- 1 pill BID x 10 days. #20/ no refills. Needs appt if no better.     If obtained at [place], price should be $8.36.  ES

## 2021-01-20 DIAGNOSIS — E78.5 HYPERLIPIDEMIA, UNSPECIFIED HYPERLIPIDEMIA TYPE: ICD-10-CM

## 2021-01-20 RX ORDER — ATORVASTATIN CALCIUM 40 MG/1
TABLET, FILM COATED ORAL
Qty: 90 TABLET | Refills: 3 | Status: SHIPPED | OUTPATIENT
Start: 2021-01-20 | End: 2022-01-24

## 2021-01-20 NOTE — TELEPHONE ENCOUNTER
This medication refill is regarding a electronic request by [employer]. Requested Prescriptions     Pending Prescriptions Disp Refills    atorvastatin (LIPITOR) 40 MG tablet [Pharmacy Med Name: Atorvastatin Calcium Oral Tablet 40 MG] 90 tablet 0     Sig: TAKE 1 TABLET BY MOUTH ONE TIME A DAY       Date of last visit: 8/6/2020  Date of next visit: 2/10/2021  Date of last refill: 1/9/2020  90/3      Last Lipid Panel:    Lab Results   Component Value Date    CHOL 147 01/13/2020    TRIG 256 01/13/2020    HDL 35 01/13/2020    LDLCALC 61 01/13/2020     Last CMP:   Lab Results   Component Value Date     09/18/2020    K 4.3 09/18/2020     09/18/2020    CO2 26 09/18/2020    BUN 23 (H) 09/18/2020    CREATININE 1.1 09/18/2020    GLUCOSE 118 (H) 09/18/2020    CALCIUM 9.4 09/18/2020    PROT 6.1 01/13/2020    LABALBU 4.0 01/13/2020    BILITOT 0.9 01/13/2020    ALKPHOS 42 01/13/2020    AST 25 01/13/2020    ALT 40 01/13/2020    LABGLOM 71 (A) 09/18/2020       Last Hemoglobin A1C:    Lab Results   Component Value Date    LABA1C 5.3 01/13/2020    AVGG 99 01/13/2020       Rx verified, ordered and set to EP.

## 2021-02-10 ENCOUNTER — OFFICE VISIT (OUTPATIENT)
Dept: FAMILY MEDICINE CLINIC | Age: 51
End: 2021-02-10
Payer: COMMERCIAL

## 2021-02-10 VITALS
TEMPERATURE: 97.2 F | HEART RATE: 80 BPM | BODY MASS INDEX: 28.2 KG/M2 | SYSTOLIC BLOOD PRESSURE: 124 MMHG | HEIGHT: 70 IN | WEIGHT: 197 LBS | DIASTOLIC BLOOD PRESSURE: 86 MMHG | RESPIRATION RATE: 16 BRPM

## 2021-02-10 DIAGNOSIS — R79.9 ELEVATED BUN: ICD-10-CM

## 2021-02-10 DIAGNOSIS — Z13.1 SCREENING FOR DIABETES MELLITUS: ICD-10-CM

## 2021-02-10 DIAGNOSIS — E78.5 HYPERLIPIDEMIA, UNSPECIFIED HYPERLIPIDEMIA TYPE: ICD-10-CM

## 2021-02-10 DIAGNOSIS — Z00.00 WELL ADULT EXAM: Primary | ICD-10-CM

## 2021-02-10 PROCEDURE — 99396 PREV VISIT EST AGE 40-64: CPT | Performed by: NURSE PRACTITIONER

## 2021-02-10 ASSESSMENT — ENCOUNTER SYMPTOMS
SHORTNESS OF BREATH: 0
EYES NEGATIVE: 1
ABDOMINAL PAIN: 0
CHEST TIGHTNESS: 0
BLOOD IN STOOL: 0

## 2021-02-10 ASSESSMENT — PATIENT HEALTH QUESTIONNAIRE - PHQ9
SUM OF ALL RESPONSES TO PHQ QUESTIONS 1-9: 0
SUM OF ALL RESPONSES TO PHQ9 QUESTIONS 1 & 2: 0

## 2021-02-10 NOTE — PATIENT INSTRUCTIONS
Dr. Wright Muss  Patient Education        Well Visit, Ages 25 to 48: Care Instructions  Your Care Instructions     Physical exams can help you stay healthy. Your doctor has checked your overall health and may have suggested ways to take good care of yourself. He or she also may have recommended tests. At home, you can help prevent illness with healthy eating, regular exercise, and other steps. Follow-up care is a key part of your treatment and safety. Be sure to make and go to all appointments, and call your doctor if you are having problems. It's also a good idea to know your test results and keep a list of the medicines you take. How can you care for yourself at home? · Reach and stay at a healthy weight. This will lower your risk for many problems, such as obesity, diabetes, heart disease, and high blood pressure. · Get at least 30 minutes of physical activity on most days of the week. Walking is a good choice. You also may want to do other activities, such as running, swimming, cycling, or playing tennis or team sports. Discuss any changes in your exercise program with your doctor. · Do not smoke or allow others to smoke around you. If you need help quitting, talk to your doctor about stop-smoking programs and medicines. These can increase your chances of quitting for good. · Talk to your doctor about whether you have any risk factors for sexually transmitted infections (STIs). Having one sex partner (who does not have STIs and does not have sex with anyone else) is a good way to avoid these infections. · Use birth control if you do not want to have children at this time. Talk with your doctor about the choices available and what might be best for you. · Protect your skin from too much sun. When you're outdoors from 10 a.m. to 4 p.m., stay in the shade or cover up with clothing and a hat with a wide brim. Wear sunglasses that block UV rays. Even when it's cloudy, put broad-spectrum sunscreen (SPF 30 or higher) on any exposed skin. · See a dentist one or two times a year for checkups and to have your teeth cleaned. · Wear a seat belt in the car. Follow your doctor's advice about when to have certain tests. These tests can spot problems early. For everyone  · Cholesterol. Have the fat (cholesterol) in your blood tested after age 21. Your doctor will tell you how often to have this done based on your age, family history, or other things that can increase your risk for heart disease. · Blood pressure. Have your blood pressure checked during a routine doctor visit. Your doctor will tell you how often to check your blood pressure based on your age, your blood pressure results, and other factors. · Vision. Talk with your doctor about how often to have a glaucoma test.  · Diabetes. Ask your doctor whether you should have tests for diabetes. · Colon cancer. Your risk for colorectal cancer gets higher as you get older. Some experts say that adults should start regular screening at age 48 and stop at age 76. Others say to start before age 48 or continue after age 76. Talk with your doctor about your risk and when to start and stop screening. For women  · Breast exam and mammogram. Talk to your doctor about when you should have a clinical breast exam and a mammogram. Medical experts differ on whether and how often women under 50 should have these tests. Your doctor can help you decide what is right for you. · Cervical cancer screening test and pelvic exam. Begin with a Pap test at age 24. The test often is part of a pelvic exam. Starting at age 27, you may choose to have a Pap test, an HPV test, or both tests at the same time (called co-testing). Talk with your doctor about how often to have testing. · Tests for sexually transmitted infections (STIs). Ask whether you should have tests for STIs. You may be at risk if you have sex with more than one person, especially if your partners do not wear condoms. For men  · Tests for sexually transmitted infections (STIs). Ask whether you should have tests for STIs. You may be at risk if you have sex with more than one person, especially if you do not wear a condom. · Testicular cancer exam. Ask your doctor whether you should check your testicles regularly. · Prostate exam. Talk to your doctor about whether you should have a blood test (called a PSA test) for prostate cancer. Experts differ on whether and when men should have this test. Some experts suggest it if you are older than 39 and are -American or have a father or brother who got prostate cancer when he was younger than 72. When should you call for help? Watch closely for changes in your health, and be sure to contact your doctor if you have any problems or symptoms that concern you. Where can you learn more? Go to [email or web address] and sign in to your [employer] account. Enter P072 in the [facility] box to learn more about \"Well Visit, Ages 25 to 48: Care Instructions. \"     If you do not have an account, please click on the \"Sign Up Now\" link. Current as of: May 27, 2020               Content Version: 12.6  © 3833-5718 [employer].

## 2021-02-10 NOTE — PROGRESS NOTES
Chief Complaint   Patient presents with    Annual Exam     Here today for annual exam.     Ankle Problem     C/O intermittent cellulitis/swelling in b/l ankles, discuss referral to podiatry. SUBJECTIVE     Grant Arriola is a 52 y.o.male    Pt presents for annual wellness physical exam.      Pt stable since last visit- no new problems for diagnoses listed below:  Patient Active Problem List   Diagnosis    Hyperlipidemia    Elevated alanine aminotransferase (ALT) level       Pt had cellulitis in his left ankle in 2017 and reports it has not been the same since. Antibiotics did resolve it at the time. Last summer he had cellulitis a few times. He was provided a referral to podiatry over the summer, but has not had any issues since. Was interested in the provider's name so he can call her to schedule if he has issues again. Review of Systems   Constitutional: Negative for chills, fatigue, fever and unexpected weight change. HENT: Negative. Eyes: Negative. Respiratory: Negative for chest tightness and shortness of breath. Cardiovascular: Negative for chest pain, palpitations and leg swelling. Gastrointestinal: Negative for abdominal pain and blood in stool. Genitourinary: Negative for dysuria. Musculoskeletal: Negative for joint swelling. Occasional cellulitis and edema in ankles, left>right   Skin: Negative for rash. Neurological: Negative for dizziness. Psychiatric/Behavioral: Negative. All other systems reviewed and are negative. OBJECTIVE     /86 (Site: Right Upper Arm)   Pulse 80   Temp 97.2 °F (36.2 °C)   Resp 16   Ht 5' 9.5\" (1.765 m)   Wt 197 lb (89.4 kg)   BMI 28.67 kg/m²     Wt Readings from Last 3 Encounters:   02/10/21 197 lb (89.4 kg)   09/23/20 193 lb (87.5 kg)   09/18/20 193 lb (87.5 kg)       Physical Exam  Vitals signs and nursing note reviewed. Constitutional:       Appearance: He is well-developed.    HENT: Head: Normocephalic and atraumatic. Right Ear: External ear normal.      Left Ear: External ear normal.      Nose: Nose normal.   Eyes:      Conjunctiva/sclera: Conjunctivae normal.      Pupils: Pupils are equal, round, and reactive to light. Neck:      Musculoskeletal: Normal range of motion and neck supple. Cardiovascular:      Rate and Rhythm: Normal rate and regular rhythm. Pulmonary:      Effort: Pulmonary effort is normal.      Breath sounds: Normal breath sounds. Abdominal:      General: Bowel sounds are normal.      Palpations: Abdomen is soft. Musculoskeletal: Normal range of motion. Skin:     General: Skin is warm and dry. Neurological:      Mental Status: He is alert and oriented to person, place, and time. Deep Tendon Reflexes: Reflexes are normal and symmetric. Psychiatric:         Behavior: Behavior normal.         Thought Content:  Thought content normal.         Judgment: Judgment normal.         Component      Latest Ref Rng & Units 1/13/2020   WBC      4.8 - 10.8 thou/mm3 6.0   RBC      4.70 - 6.10 mill/mm3 4.99   Hemoglobin Quant      14.0 - 18.0 gm/dl 15.8   Hematocrit      42.0 - 52.0 % 45.7   MCV      80.0 - 94.0 fL 91.6   MCH      26.0 - 33.0 pg 31.7   MCHC      32.2 - 35.5 gm/dl 34.6   RDW-CV      11.5 - 14.5 % 11.9   RDW-SD      35.0 - 45.0 fL 39.8   Platelet Count      778 - 400 thou/mm3 299   MPV      9.4 - 12.4 fL 9.7   Seg Neutrophils      % 65.8   Lymphocytes      % 22.7   Monocytes      % 8.7   Eosinophils      % 1.8   Basophils      % 0.7   Immature Granulocytes      % 0.3   Segs Absolute      1.8 - 7.7 thou/mm3 3.9   Lymphocytes Absolute      1.0 - 4.8 thou/mm3 1.4   Monocytes Absolute      0.4 - 1.3 thou/mm3 0.5   Eosinophils Absolute      0.0 - 0.4 thou/mm3 0.1   Basophils Absolute      0.0 - 0.1 thou/mm3 0.0   Immature Grans (Abs)      0.00 - 0.07 thou/mm3 0.02   Nucleated Red Blood Cells      /100 wbc 0   Glucose      70 - 108 mg/dL 108   Creatinine 0.4 - 1.2 mg/dL 0.9   BUN      7 - 22 mg/dL 14   Sodium      135 - 145 meq/L 141   Potassium      3.5 - 5.2 meq/L 4.4   Chloride      98 - 111 meq/L 105   CO2      23 - 33 meq/L 26   Calcium      8.5 - 10.5 mg/dL 9.0   AST      5 - 40 U/L 25   Alk Phos      38 - 126 U/L 42   Total Protein      6.1 - 8.0 g/dL 6.1   Albumin      3.5 - 5.1 g/dL 4.0   Bilirubin      0.3 - 1.2 mg/dL 0.9   ALT      11 - 66 U/L 40   Cholesterol, Total      100 - 199 mg/dL 147   Triglycerides      0 - 199 mg/dL 256 (H)   HDL Cholesterol      mg/dL 35   LDL Calculated      mg/dL 61   Hemoglobin A1C      4.4 - 6.4 % 5.3   AVERAGE GLUCOSE      70 - 126 mg/dL 99       Component      Latest Ref Rng & Units 9/18/2020   WBC      4.8 - 10.8 thou/mm3 13.5 (H)   RBC      4.70 - 6.10 mill/mm3 5.18   Hemoglobin Quant      14.0 - 18.0 gm/dl 16.5   Hematocrit      42.0 - 52.0 % 47.9   MCV      80.0 - 94.0 fL 92.5   MCH      26.0 - 33.0 pg 31.9   MCHC      32.2 - 35.5 gm/dl 34.4   RDW-CV      11.5 - 14.5 % 11.9   RDW-SD      35.0 - 45.0 fL 40.2   Platelet Count      712 - 400 thou/mm3 305   MPV      9.4 - 12.4 fL 9.5   Seg Neutrophils      % 86.7   Lymphocytes      % 6.9   Monocytes      % 5.0   Eosinophils      % 0.9   Basophils      % 0.1   Immature Granulocytes      % 0.4   Segs Absolute      1.8 - 7.7 thou/mm3 11.7 (H)   Lymphocytes Absolute      1.0 - 4.8 thou/mm3 0.9 (L)   Monocytes Absolute      0.4 - 1.3 thou/mm3 0.7   Eosinophils Absolute      0.0 - 0.4 thou/mm3 0.1   Basophils Absolute      0.0 - 0.1 thou/mm3 0.0   Immature Grans (Abs)      0.00 - 0.07 thou/mm3 0.06   Nucleated Red Blood Cells      /100 wbc 0   Sodium      135 - 145 meq/L 141   Potassium      3.5 - 5.2 meq/L 4.3   Chloride      98 - 111 meq/L 105   CO2      23 - 33 meq/L 26   Glucose      70 - 108 mg/dL 118 (H)   BUN      7 - 22 mg/dL 23 (H)   Creatinine      0.4 - 1.2 mg/dL 1.1   Calcium      8.5 - 10.5 mg/dL 9.4   Anion Gap      8.0 - 16.0 meq/L 10.0   Est, Glom Filt Rate

## 2021-08-13 ENCOUNTER — TELEPHONE (OUTPATIENT)
Dept: FAMILY MEDICINE CLINIC | Age: 51
End: 2021-08-13

## 2021-08-13 DIAGNOSIS — L03.116 CELLULITIS OF LEFT ANKLE: Primary | ICD-10-CM

## 2021-08-13 RX ORDER — CEFADROXIL 500 MG/1
500 CAPSULE ORAL 2 TIMES DAILY
Qty: 20 CAPSULE | Refills: 0 | Status: SHIPPED | OUTPATIENT
Start: 2021-08-13 | End: 2021-08-13

## 2021-08-13 RX ORDER — CEFADROXIL 500 MG/1
500 CAPSULE ORAL 2 TIMES DAILY
Qty: 20 CAPSULE | Refills: 0 | Status: SHIPPED | OUTPATIENT
Start: 2021-08-13 | End: 2021-08-23

## 2021-08-13 NOTE — TELEPHONE ENCOUNTER
Patient notified. Rx was sent to incorrect pharmacy. Discussed with ES and escripted to correct pharmacy. Patient notified.

## 2021-08-13 NOTE — TELEPHONE ENCOUNTER
Patient calls today with c/o left ankle pain that started yesterday. States that you have treated him in the past for cellulitis and this pain is exactly the same. He is requesting ATB at this time. We talked about the signs/symptoms of DVT and patient states that he has no other symptoms other than the pain at this time. Pharmacy info entered and allergies verified. CPB    He would also like to go ahead and schedule with podiatry. (see office note from February--TS previously gave the OK for this) Referral faxed to Dr. Lawton Res office, they will call the patient to schedule.

## 2021-08-13 NOTE — TELEPHONE ENCOUNTER
Discussion noted. Chart reviewed. Patient previously treated with Duricef 500 mg - 1 pill twice daily x10 days in November 2020. Will refill at this time. (#20/no refills)  Appointment if no better. Rx sent.   ES

## 2021-08-17 NOTE — TELEPHONE ENCOUNTER
CHINMAYI    Pt returned call, stated he did not want to be seen, he has a visit with podiatry on Friday. He stated he had been on a mini vacation the last 3 days and was on foot a lot, he is planning on resting foot, keeping podiatry appt. Pt Plan is  Refused appt, resting and keeping Podiatry appt this Friday.

## 2021-08-17 NOTE — TELEPHONE ENCOUNTER
Patient called in today stating he still has no relief from the antibiotic. He can not sleep from the pain, his left ankle/foot is warm to the touch and swollen. I discussed with the patient that the doctor had previously suggested an appointment if it was not better. I offered the pt an appointment for today if he could make it or tomorrow if he couldn't make it today. Pt declined both appointments stating he doesn't feel like he needs to come into the office.

## 2022-01-23 DIAGNOSIS — E78.5 HYPERLIPIDEMIA, UNSPECIFIED HYPERLIPIDEMIA TYPE: ICD-10-CM

## 2022-01-24 RX ORDER — ATORVASTATIN CALCIUM 40 MG/1
TABLET, FILM COATED ORAL
Qty: 90 TABLET | Refills: 0 | Status: SHIPPED | OUTPATIENT
Start: 2022-01-24 | End: 2022-02-14 | Stop reason: SDUPTHER

## 2022-01-24 NOTE — TELEPHONE ENCOUNTER
Patient's last appointment was : 2/10/2021  Patient's next appointment is : 2/14/2022  Last refilled:01/20/2021

## 2022-02-07 ENCOUNTER — TELEPHONE (OUTPATIENT)
Dept: FAMILY MEDICINE CLINIC | Age: 52
End: 2022-02-07

## 2022-02-07 DIAGNOSIS — R73.01 IFG (IMPAIRED FASTING GLUCOSE): ICD-10-CM

## 2022-02-07 DIAGNOSIS — E78.5 HYPERLIPIDEMIA, UNSPECIFIED HYPERLIPIDEMIA TYPE: Primary | ICD-10-CM

## 2022-02-07 DIAGNOSIS — D72.829 LEUKOCYTOSIS, UNSPECIFIED TYPE: ICD-10-CM

## 2022-02-07 NOTE — TELEPHONE ENCOUNTER
Ok to update orders for lipids/cmp. (current orders will  on 2/10/22-not sure if pt will complete by that time)

## 2022-02-07 NOTE — TELEPHONE ENCOUNTER
Orders printed and in ES nurse tray. LM on VM to clarify with the pt if he is going to [facility] (went there previously) or [facility].

## 2022-02-07 NOTE — TELEPHONE ENCOUNTER
----- Message from Antonio Victoria sent at 2/7/2022  3:43 PM EST -----  Subject: Referral Request    QUESTIONS   Reason for referral request? blood work before upcoming 2/14 appt with Dr. Fina Luna   Has the physician seen you for this condition before? No   Preferred Specialist (if applicable)? Do you already have an appointment scheduled? No  Additional Information for Provider? He would like to have the blood work   sent to [facility]. He says there are two facilities on   [address], but it's downtown and it's where he's had blood work sent   in the past from this PCP. Please contact him if additional information is   needed. ---------------------------------------------------------------------------  --------------  Erika TOLENTINO  What is the best way for the office to contact you? OK to leave message on   voicemail, OK to respond with electronic message via [employer] portal (only   for patients who have registered [employer] account)  Preferred Call Back Phone Number?  [phone]

## 2022-02-07 NOTE — TELEPHONE ENCOUNTER
Check HG A1c, FLP, CMP, and CBC at this time. Diagnoses: IFG, hyperlipidemia, leukocytosis, labs associated with general medical exam, screening lipid, screening for diabetes.   ES

## 2022-02-08 NOTE — TELEPHONE ENCOUNTER
Patient contacted [facility] and would like orders faxed to [facility] [address]. Orders faxed and detailed message left for pt letting him know this was done.

## 2022-02-09 LAB
ALBUMIN SERPL-MCNC: 4.4 G/DL (ref 3.2–5.3)
ALK PHOSPHATASE: 50 U/L (ref 39–130)
ALT SERPL-CCNC: 81 U/L (ref 0–40)
ANION GAP SERPL CALCULATED.3IONS-SCNC: 9 MMOL/L (ref 5–15)
AST SERPL-CCNC: 43 U/L (ref 0–41)
AVERAGE GLUCOSE: 111 MG/DL
BASOPHILS # BLD: 1 %
BASOPHILS ABSOLUTE: 0.1 X10E9/L (ref 0–0.2)
BILIRUB SERPL-MCNC: 1 MG/DL (ref 0.3–1.2)
BUN BLDV-MCNC: 14 MG/DL (ref 5–23)
CALCIUM SERPL-MCNC: 9.5 MG/DL (ref 8.5–10.5)
CHLORIDE BLD-SCNC: 103 MMOL/L (ref 98–109)
CHOLESTEROL/HDL RATIO: 4.2 (ref 1–5)
CHOLESTEROL: 166 MG/DL (ref 150–200)
CO2: 30 MMOL/L (ref 22–32)
CREAT SERPL-MCNC: 1.14 MG/DL (ref 0.6–1.3)
EGFR AFRICAN AMERICAN: >60 ML/MIN/1.73SQ.M
EGFR IF NONAFRICAN AMERICAN: >60 ML/MIN/1.73SQ.M
EOSINOPHIL # BLD: 4 %
EOSINOPHILS ABSOLUTE: 0.3 X10E9/L (ref 0–0.4)
GLUCOSE: 114 MG/DL (ref 65–99)
HBA1C MFR BLD: 5.5 % (ref 4.4–6.4)
HCT VFR BLD CALC: 46.8 % (ref 39–49)
HDLC SERPL-MCNC: 40 MG/DL
HEMOGLOBIN: 16.3 G/DL (ref 13–17)
LDL CHOLESTEROL CALCULATED: 73 MG/DL
LDL/HDL RATIO: 1.8
LYMPHOCYTES # BLD: 34.3 %
LYMPHOCYTES ABSOLUTE: 2.2 X10E9/L (ref 1–3.5)
MCH RBC QN AUTO: 31.4 PG (ref 27–34)
MCHC RBC AUTO-ENTMCNC: 34.9 G/DL (ref 32–36)
MCV RBC AUTO: 90 FL (ref 80–100)
MONOCYTES # BLD: 7.1 %
MONOCYTES ABSOLUTE: 0.4 X10E9/L (ref 0–0.9)
NEUTROPHILS ABSOLUTE: 3.4 X10E9/L (ref 1.5–6.6)
NEUTROPHILS RELATIVE PERCENT: 53.6 %
PDW BLD-RTO: 12.9 % (ref 11.5–15)
PLATELETS: 341 X10E9/L (ref 150–450)
PMV BLD AUTO: 7.6 FL (ref 7–12)
POTASSIUM SERPL-SCNC: 3.8 MMOL/L (ref 3.5–5)
RBC # BLD: NORMAL 10*6/UL
RBC: 5.21 X10E12/L (ref 4.1–5.7)
SODIUM BLD-SCNC: 142 MMOL/L (ref 134–146)
TOTAL PROTEIN: 6.6 G/DL (ref 6–8)
TRIGL SERPL-MCNC: 266 MG/DL (ref 27–150)
VLDLC SERPL CALC-MCNC: 53 MG/DL (ref 0–30)
WBC: 6.3 X10E9/L (ref 4–11)

## 2022-02-11 NOTE — PROGRESS NOTES
[facility]  [address] [place] [address] Rd., [address] [id number]  Dept: [phone]  Dept Fax: [phone]    KRYSTYNA Fry is a 54 y.o.male    Pt presents for annual wellness physical exam.      Patient also presents for follow-up of Hyperlipidemia. Glucometer readings at home are not needed. The home BP readings have not been checked regularly. Pt doing well at this time- denies any new complaints at this time. Wt Readings from Last 3 Encounters:   02/14/22 200 lb (90.7 kg)   02/10/21 197 lb (89.4 kg)   09/23/20 193 lb (87.5 kg)   Weight increased 3# since last visit 12 months ago. \"I'm gonna blame my girlfriend for that\"     Pt stable since last visit- no new problems for diagnoses listed below:  Patient Active Problem List   Diagnosis    Hyperlipidemia    Elevated alanine aminotransferase (ALT) level     Review of Systems   Constitutional: Positive for diaphoresis (occasionally at HS.  ). Negative for chills, fatigue, fever and unexpected weight change. Eyes: Negative for visual disturbance. Respiratory: Negative for chest tightness and shortness of breath. Cardiovascular: Negative for chest pain, palpitations and leg swelling. Gastrointestinal: Negative for abdominal pain, anal bleeding, blood in stool, constipation, diarrhea, nausea and vomiting. Genitourinary: Negative for dysuria and hematuria. Musculoskeletal: Negative for neck pain. Neurological: Negative for dizziness, light-headedness and headaches. OBJECTIVE     /86 (Site: Left Upper Arm)   Pulse 72   Temp 98.3 °F (36.8 °C) (Oral)   Resp 16   Ht 5' 9\" (1.753 m)   Wt 200 lb (90.7 kg)   BMI 29.53 kg/m²     Wt Readings from Last 3 Encounters:   02/14/22 200 lb (90.7 kg)   02/10/21 197 lb (89.4 kg)   09/23/20 193 lb (87.5 kg)     Physical Exam  Vitals and nursing note reviewed. Constitutional:       Appearance: He is well-developed. HENT:      Head: Normocephalic and atraumatic.       Right Ear: External ear normal.      Left Ear: External ear normal.      Nose: Nose normal.   Eyes:      Conjunctiva/sclera: Conjunctivae normal.      Pupils: Pupils are equal, round, and reactive to light. Cardiovascular:      Rate and Rhythm: Normal rate and regular rhythm. Pulmonary:      Effort: Pulmonary effort is normal.      Breath sounds: Normal breath sounds. Abdominal:      General: Bowel sounds are normal.      Palpations: Abdomen is soft. Musculoskeletal:         General: Normal range of motion. Cervical back: Normal range of motion and neck supple. Skin:     General: Skin is warm and dry. Neurological:      Mental Status: He is alert and oriented to person, place, and time. Deep Tendon Reflexes: Reflexes are normal and symmetric. Psychiatric:         Behavior: Behavior normal.         Thought Content:  Thought content normal.         Judgment: Judgment normal.       Component      Latest Ref Rng & Units 2/9/2022   WBC      4.0 - 11.0 X10E9/L 6.3   RBC      4.10 - 5.70 X10E12/L 5.21   Hemoglobin Quant      13.0 - 17.0 g/dL 16.3   Hematocrit      39 - 49 % 46.8   MCV      80 - 100 fL 90   MCH      27 - 34 pg 31.4   MCHC      32 - 36 g/dL 34.9   RDW      11.5 - 15.0 % 12.9   Platelet Count      832 - 450 X10E9/L 341   MPV      7 - 12 fL 7.6   Neutrophils %      % 53.6   Neutrophils Absolute      1.5 - 6.6 X10E9/L 3.4   Lymphocytes      % 34.3   Lymphocytes Absolute      1.0 - 3.5 X10E9/L 2.2   Monocytes      % 7.1   Monocytes Absolute      0 - 0.9 X10E9/L 0.4   Eosinophils      % 4.0   Eosinophils Absolute      0.0 - 0.4 X10E9/L 0.3   Basophils      % 1.0   Basophils Absolute      0.0 - 0.2 X10E9/L 0.1   RBC Morphology       SEE NOTE   Glucose      65 - 99 mg/dL 114 (H)   BUN      5 - 23 mg/dL 14   Creatinine      0.60 - 1.30 mg/dL 1.14   eGFR African American      >59 ml/min/1.73sq.m >60   EGFR IF NonAfrican American      >59 ml/min/1.73sq.m >60   CALCIUM, SERUM, [id number]      8.5 - 10.5 mg/dL 9.5   Sodium      134 - 146 mmol/L 142   Potassium      3.5 - 5.0 mmol/L 3.8   Chloride      98 - 109 mmol/L 103   CO2      22 - 32 mmol/L 30   Anion Gap      5 - 15 mmol/L 9   Bilirubin      0.3 - 1.2 mg/dL 1.0   Alk Phosphatase      39 - 130 U/L 50   AST      0 - 41 U/L 43 (H)   ALT      0 - 40 U/L 81 (H)   Total Protein      6.0 - 8.0 g/dL 6.6   Albumin      3.2 - 5.3 g/dL 4.4   Cholesterol      150 - 200 mg/dL 166   Triglycerides      27 - 150 mg/dL 266 (H)   HDL Cholesterol      >39 mg/dL 40   LDL Calculated      <130 mg/dL 73   VLDL Cholesterol Calculated      0 - 30 mg/dL 53 (H)   LDl/HDL Ratio      <3.5 1.8   Chol/HDL Ratio      1.0 - 5.0 4.2   Hemoglobin A1C      4.4 - 6.4 % 5.5   AVERAGE GLUCOSE      mg/dL 111     Lab Results   Component Value Date    LABA1C 5.5 02/09/2022     No results found for: EAG    Lab Results   Component Value Date    CHOL 166 02/09/2022    TRIG 266 (H) 02/09/2022    HDL 40 02/09/2022    LDLCALC 73 02/09/2022    LDLDIRECT 88 10/27/2017     The 10-year ASCVD risk score (Neal Gale, et al., 2013) is: 2.7%    Values used to calculate the score:      Age: 54 years      Sex: Male      Is Non- : No      Diabetic: No      Tobacco smoker: No      Systolic Blood Pressure: 667 mmHg      Is BP treated: No      HDL Cholesterol: 40 mg/dL      Total Cholesterol: 166 mg/dL    Lab Results   Component Value Date     02/09/2022    K 3.8 02/09/2022     02/09/2022    CO2 30 02/09/2022    BUN 14 02/09/2022    CREATININE 1.14 02/09/2022    GLUCOSE 114 (H) 02/09/2022    CALCIUM 9.5 02/09/2022    PROT 6.6 02/09/2022    LABALBU 4.4 02/09/2022    BILITOT 1.0 02/09/2022    ALKPHOS 50 02/09/2022    AST 43 (H) 02/09/2022    ALT 81 (H) 02/09/2022    LABGLOM 71 (A) 09/18/2020     No results found for: LABMICR, TQKP26LZR    No results found for: TSH, K1LQFCD, Y6HENNV, THYROIDAB, FT3, T4FREE    Lab Results   Component Value Date    WBC 6.3 02/09/2022    HGB 16.3 02/09/2022    HCT 46.8 02/09/2022    MCV 90 02/09/2022     02/09/2022     No results found for: PSA, PSADIA    Immunization History   Administered Date(s) Administered    COVID-19, Moderna, Primary or Immunocompromised, PF, 100mcg/0.5mL 12/29/2021, 02/02/2022    Influenza Virus Vaccine 10/14/2013    Influenza Whole 10/14/2013    Tdap (Boostrix, Adacel) 05/14/2015       Health Maintenance   Topic Date Due    Depression Screen  Never done    Colon cancer screen colonoscopy  Never done    Flu vaccine (1) 02/14/2023 (Originally 9/1/2021)    COVID-19 Vaccine (3 - Booster for Moderna series) 07/02/2022    Lipid screen  02/09/2023    Diabetes screen  02/09/2025    DTaP/Tdap/Td vaccine (2 - Td or Tdap) 05/14/2025    HIV screen  Completed    Hepatitis A vaccine  Aged Out    Hepatitis B vaccine  Aged Out    Hib vaccine  Aged Out    Meningococcal (ACWY) vaccine  Aged Out    Pneumococcal 0-64 years Vaccine  Aged Out    Hepatitis C screen  Discontinued       CT Lung Screen (50-80, 20 pk-yrs, smoking or quit <15 years) indicated at this time? No tobacco history  Sleep Medicine referral indicated at this time (Obesity, Snoring, Daytime Somnolence, Apneic Episodes)? Pt denies any current symptoms. No future appointments. ASSESSMENT       Diagnosis Orders   1. Well adult exam     2. Hyperlipidemia, unspecified hyperlipidemia type  Triglyceride    atorvastatin (LIPITOR) 40 MG tablet   3. Elevated LFTs  AST    ALT   4. Screening PSA (prostate specific antigen)  PSA screening     PLAN      After discussion with patient, continue Lipitor 40 mg daily. Continue to work on diet, exercise, and weight loss at this time. Recheck TG, AST/ALT, and PSA in months  Continue current meds  Refills   Follow up in 12 months if doing well and labs controlled. Preventive Health Topics:  Pt declines HEP C screening at this time due to lack of risk factors. Encouraged COVID VACCINE booster when able. Encouraged annual FLU VACCINE- pt declines. (updated 2/14/2022)  Encouraged SHINGLES SHOT [facility]) after age 48- check with insurance re coverage and location of administration. ([facility]ctors office vs local pharmacy) (updated 2/14/2022)  Encouraged screening COLONOSCOPY- referral card given.         Electronically signed Aashish Nam MD on 2/14/2022 at 9:28 AM

## 2022-02-14 ENCOUNTER — OFFICE VISIT (OUTPATIENT)
Dept: FAMILY MEDICINE CLINIC | Age: 52
End: 2022-02-14
Payer: COMMERCIAL

## 2022-02-14 VITALS
BODY MASS INDEX: 29.62 KG/M2 | DIASTOLIC BLOOD PRESSURE: 86 MMHG | WEIGHT: 200 LBS | TEMPERATURE: 98.3 F | HEIGHT: 69 IN | HEART RATE: 72 BPM | RESPIRATION RATE: 16 BRPM | SYSTOLIC BLOOD PRESSURE: 120 MMHG

## 2022-02-14 DIAGNOSIS — Z12.5 SCREENING PSA (PROSTATE SPECIFIC ANTIGEN): ICD-10-CM

## 2022-02-14 DIAGNOSIS — R79.89 ELEVATED LFTS: ICD-10-CM

## 2022-02-14 DIAGNOSIS — E78.5 HYPERLIPIDEMIA, UNSPECIFIED HYPERLIPIDEMIA TYPE: ICD-10-CM

## 2022-02-14 DIAGNOSIS — Z00.00 WELL ADULT EXAM: Primary | ICD-10-CM

## 2022-02-14 PROCEDURE — 99396 PREV VISIT EST AGE 40-64: CPT | Performed by: FAMILY MEDICINE

## 2022-02-14 RX ORDER — ATORVASTATIN CALCIUM 40 MG/1
40 TABLET, FILM COATED ORAL DAILY
Qty: 90 TABLET | Refills: 3 | Status: SHIPPED | OUTPATIENT
Start: 2022-02-14

## 2022-02-14 SDOH — ECONOMIC STABILITY: FOOD INSECURITY: WITHIN THE PAST 12 MONTHS, YOU WORRIED THAT YOUR FOOD WOULD RUN OUT BEFORE YOU GOT MONEY TO BUY MORE.: PATIENT DECLINED

## 2022-02-14 SDOH — ECONOMIC STABILITY: FOOD INSECURITY: WITHIN THE PAST 12 MONTHS, THE FOOD YOU BOUGHT JUST DIDN'T LAST AND YOU DIDN'T HAVE MONEY TO GET MORE.: PATIENT DECLINED

## 2022-02-14 ASSESSMENT — PATIENT HEALTH QUESTIONNAIRE - PHQ9
SUM OF ALL RESPONSES TO PHQ QUESTIONS 1-9: 0
SUM OF ALL RESPONSES TO PHQ QUESTIONS 1-9: 0
2. FEELING DOWN, DEPRESSED OR HOPELESS: 0
1. LITTLE INTEREST OR PLEASURE IN DOING THINGS: 0
SUM OF ALL RESPONSES TO PHQ QUESTIONS 1-9: 0
SUM OF ALL RESPONSES TO PHQ9 QUESTIONS 1 & 2: 0
SUM OF ALL RESPONSES TO PHQ QUESTIONS 1-9: 0

## 2022-02-14 ASSESSMENT — SOCIAL DETERMINANTS OF HEALTH (SDOH): HOW HARD IS IT FOR YOU TO PAY FOR THE VERY BASICS LIKE FOOD, HOUSING, MEDICAL CARE, AND HEATING?: PATIENT DECLINED

## 2022-02-14 ASSESSMENT — ENCOUNTER SYMPTOMS
CONSTIPATION: 0
ANAL BLEEDING: 0
BLOOD IN STOOL: 0
NAUSEA: 0
ABDOMINAL PAIN: 0
DIARRHEA: 0
CHEST TIGHTNESS: 0
VOMITING: 0
SHORTNESS OF BREATH: 0

## 2022-02-14 NOTE — PATIENT INSTRUCTIONS
After discussion with patient, continue Lipitor 40 mg daily. Continue to work on diet, exercise, and weight loss at this time. Recheck TG, AST/ALT, and PSA in months  Continue current meds  Refills   Follow up in 12 months if doing well and labs controlled. Preventive Health Topics:  Pt declines HEP C screening at this time due to lack of risk factors. Encouraged COVID VACCINE booster when able. Encouraged annual FLU VACCINE- pt declines. (updated 2/14/2022)  Encouraged SHINGLES SHOT [facility]) after age 48- check with insurance re coverage and location of administration. ([facility]ctors office vs local pharmacy) (updated 2/14/2022)  Encouraged screening COLONOSCOPY- referral card given.

## 2022-03-07 ENCOUNTER — HOSPITAL ENCOUNTER (OUTPATIENT)
Dept: MRI IMAGING | Age: 52
Discharge: HOME | End: 2022-03-07
Payer: COMMERCIAL

## 2022-03-07 DIAGNOSIS — S93.412A SPRAIN OF CALCANEOFIBULAR LIGAMENT OF LEFT ANKLE, INITIAL ENCOUNTER: ICD-10-CM

## 2022-03-07 PROCEDURE — 73721 MRI JNT OF LWR EXTRE W/O DYE: CPT

## 2023-02-22 DIAGNOSIS — E78.5 HYPERLIPIDEMIA, UNSPECIFIED HYPERLIPIDEMIA TYPE: ICD-10-CM

## 2023-02-22 RX ORDER — ATORVASTATIN CALCIUM 40 MG/1
40 TABLET, FILM COATED ORAL DAILY
Qty: 90 TABLET | Refills: 3 | OUTPATIENT
Start: 2023-02-22

## 2023-02-22 RX ORDER — ATORVASTATIN CALCIUM 40 MG/1
TABLET, FILM COATED ORAL
Qty: 90 TABLET | Refills: 0 | Status: SHIPPED | OUTPATIENT
Start: 2023-02-22

## 2023-02-22 NOTE — TELEPHONE ENCOUNTER
Patient's last appointment was : 2/14/22  Patient's next appointment is :  none  Last refilled: 2/14/22  [facility] Verified    Lab Results   Component Value Date    LABA1C 5.5 02/09/2022     Lab Results   Component Value Date    CHOL 166 02/09/2022    TRIG 266 (H) 02/09/2022    HDL 40 02/09/2022    LDLCALC 73 02/09/2022    LDLDIRECT 88 10/27/2017     Lab Results   Component Value Date     02/09/2022    K 3.8 02/09/2022     02/09/2022    CO2 30 02/09/2022    BUN 14 02/09/2022    CREATININE 1.14 02/09/2022    GLUCOSE 114 (H) 02/09/2022    CALCIUM 9.5 02/09/2022    PROT 6.6 02/09/2022    LABALBU 4.4 02/09/2022    BILITOT 1.0 02/09/2022    ALKPHOS 50 02/09/2022    AST 43 (H) 02/09/2022    ALT 81 (H) 02/09/2022    LABGLOM 71 (A) 09/18/2020     No results found for: TSH, S1AAHOX, J0FVRZM, THYROIDAB, FT3, T4FREE  Lab Results   Component Value Date    WBC 6.3 02/09/2022    HGB 16.3 02/09/2022    HCT 46.8 02/09/2022    MCV 90 02/09/2022     02/09/2022

## 2023-02-22 NOTE — TELEPHONE ENCOUNTER
Patient's last appointment was : 02/14/2022  Patient's next appointment is :  none  Last refilled: 02/14/2022  [address] Verified    Lab Results   Component Value Date    LABA1C 5.5 02/09/2022     Lab Results   Component Value Date    CHOL 166 02/09/2022    TRIG 266 (H) 02/09/2022    HDL 40 02/09/2022    LDLCALC 73 02/09/2022    LDLDIRECT 88 10/27/2017     Lab Results   Component Value Date     02/09/2022    K 3.8 02/09/2022     02/09/2022    CO2 30 02/09/2022    BUN 14 02/09/2022    CREATININE 1.14 02/09/2022    GLUCOSE 114 (H) 02/09/2022    CALCIUM 9.5 02/09/2022    PROT 6.6 02/09/2022    LABALBU 4.4 02/09/2022    BILITOT 1.0 02/09/2022    ALKPHOS 50 02/09/2022    AST 43 (H) 02/09/2022    ALT 81 (H) 02/09/2022    LABGLOM 71 (A) 09/18/2020     No results found for: TSH, E4FQDPT, B6EKIJP, THYROIDAB, FT3, T4FREE  Lab Results   Component Value Date    WBC 6.3 02/09/2022    HGB 16.3 02/09/2022    HCT 46.8 02/09/2022    MCV 90 02/09/2022     02/09/2022

## 2023-07-13 ENCOUNTER — TELEPHONE (OUTPATIENT)
Dept: FAMILY MEDICINE CLINIC | Age: 53
End: 2023-07-13

## 2023-07-17 ENCOUNTER — OFFICE VISIT (OUTPATIENT)
Dept: FAMILY MEDICINE CLINIC | Age: 53
End: 2023-07-17
Payer: COMMERCIAL

## 2023-07-17 VITALS
OXYGEN SATURATION: 98 % | TEMPERATURE: 98 F | BODY MASS INDEX: 28.29 KG/M2 | HEART RATE: 86 BPM | DIASTOLIC BLOOD PRESSURE: 96 MMHG | HEIGHT: 70 IN | RESPIRATION RATE: 18 BRPM | WEIGHT: 197.6 LBS | SYSTOLIC BLOOD PRESSURE: 124 MMHG

## 2023-07-17 DIAGNOSIS — Z00.00 ROUTINE HEALTH MAINTENANCE: Primary | ICD-10-CM

## 2023-07-17 DIAGNOSIS — E78.5 HYPERLIPIDEMIA, UNSPECIFIED HYPERLIPIDEMIA TYPE: ICD-10-CM

## 2023-07-17 PROCEDURE — 99396 PREV VISIT EST AGE 40-64: CPT | Performed by: STUDENT IN AN ORGANIZED HEALTH CARE EDUCATION/TRAINING PROGRAM

## 2023-07-17 SDOH — ECONOMIC STABILITY: INCOME INSECURITY: HOW HARD IS IT FOR YOU TO PAY FOR THE VERY BASICS LIKE FOOD, HOUSING, MEDICAL CARE, AND HEATING?: PATIENT DECLINED

## 2023-07-17 ASSESSMENT — PATIENT HEALTH QUESTIONNAIRE - PHQ9: DEPRESSION UNABLE TO ASSESS: PT REFUSES

## 2023-07-17 NOTE — PROGRESS NOTES
[address] [facility] [address]  Dept: [phone]  Dept Fax: [phone]: [phone]  Resident Note    Assessment & Plan     1. Routine health maintenance    2. Hyperlipidemia, unspecified hyperlipidemia type       No orders of the defined types were placed in this encounter. LDL is well controlled, continue current lipitor 40 mg. Advise lifestyle changes, such as reducing dietary fat intake and increase exercise to reduce triglyceride levels. Return to clinic in 1 year for annual follow-up. Patient does not have insurance currently and is self-pay. He has deferred colon cancer screening for now but will consider obtaining shingles vaccine at pharmacy. Return in about 7 months (around 2/15/2024) for statins. Future Appointments   Date Time Provider [address]   2/21/2024  1:00 PM Maria Del Rosario Jon MD [address] [facility]     History of Present Illness     Marcy Escalante is a 48 y.o. male who presents today for:  Chief Complaint   Patient presents with    Annual Exam     Trig were high on lab work but he was not on his medication for two weeks. He is back on his medication. Pt does not have insurance, he is currently getting medication where it is cheapest.      Patient presents for follow-up of his lab results and annual exam.    Reports to be doing well overall. No concerns. Patient refused phq2. Denies any issues dysphoria or anxiety. He had labs done at an outside location on July 12 as part of his work physical.  Details of these labs can be seen in the media tab. LDL was 80. Triglycerides were 247, high. Creatinine is 1.2 with normal reference range of 0.7-1.5 at this laboratory. Protein creatinine ratio was 0.05, normal.  LFTs were normal.  Blood glucose was 72. Blood pressure readings recorded as part of his physical at work was well controlled.   Systolic was under 701

## 2023-07-17 NOTE — PATIENT INSTRUCTIONS
1. Try to reduce total dietary fats to reduce triglyceride levels    2. Try to increase hydration prior to next time we measure kidney function again to see if this is due to dehydration.      Shingles vaccine

## 2023-07-18 ASSESSMENT — ENCOUNTER SYMPTOMS
SHORTNESS OF BREATH: 0
ABDOMINAL PAIN: 0
COUGH: 0
SORE THROAT: 0
EYE DISCHARGE: 0
RHINORRHEA: 0

## 2023-09-09 DIAGNOSIS — E78.5 HYPERLIPIDEMIA, UNSPECIFIED HYPERLIPIDEMIA TYPE: ICD-10-CM

## 2023-09-11 RX ORDER — ATORVASTATIN CALCIUM 40 MG/1
40 TABLET, FILM COATED ORAL DAILY
Qty: 90 TABLET | Refills: 0 | Status: SHIPPED | OUTPATIENT
Start: 2023-09-11

## 2023-09-11 NOTE — TELEPHONE ENCOUNTER
Patient's last appointment was : 7/17/23  Patient's next appointment is :  2/21/24  Last refilled: 6/13/23  [facility] Verified    Lab Results   Component Value Date    LABA1C 5.5 02/09/2022     Lab Results   Component Value Date    CHOL 166 02/09/2022    TRIG 266 (H) 02/09/2022    HDL 40 02/09/2022    LDLCALC 73 02/09/2022    LDLDIRECT 88 10/27/2017     Lab Results   Component Value Date     02/09/2022    K 3.8 02/09/2022     02/09/2022    CO2 30 02/09/2022    BUN 14 02/09/2022    CREATININE 1.14 02/09/2022    GLUCOSE 114 (H) 02/09/2022    CALCIUM 9.5 02/09/2022    PROT 6.6 02/09/2022    LABALBU 4.4 02/09/2022    BILITOT 1.0 02/09/2022    ALKPHOS 50 02/09/2022    AST 43 (H) 02/09/2022    ALT 81 (H) 02/09/2022    LABGLOM 71 (A) 09/18/2020     No results found for: \"TSH\", \"Z6ROWGE\", \"Q8ODDPH\", \"THYROIDAB\", \"FT3\", \"T4FREE\"  Lab Results   Component Value Date    WBC 6.3 02/09/2022    HGB 16.3 02/09/2022    HCT 46.8 02/09/2022    MCV 90 02/09/2022     02/09/2022

## 2023-12-15 DIAGNOSIS — E78.5 HYPERLIPIDEMIA, UNSPECIFIED HYPERLIPIDEMIA TYPE: ICD-10-CM

## 2023-12-15 DIAGNOSIS — Z13.1 SCREENING FOR DIABETES MELLITUS: ICD-10-CM

## 2023-12-15 DIAGNOSIS — Z00.00 LABORATORY EXAM ORDERED AS PART OF ROUTINE GENERAL MEDICAL EXAMINATION: ICD-10-CM

## 2023-12-15 DIAGNOSIS — Z12.5 SCREENING PSA (PROSTATE SPECIFIC ANTIGEN): Primary | ICD-10-CM

## 2023-12-15 RX ORDER — ATORVASTATIN CALCIUM 40 MG/1
40 TABLET, FILM COATED ORAL DAILY
Qty: 90 TABLET | Refills: 3 | Status: SHIPPED | OUTPATIENT
Start: 2023-12-15

## 2023-12-15 NOTE — TELEPHONE ENCOUNTER
Incoming call from patient, stated he just missed a call from [place]. Advised it looks like we were just following up on his Lipitor refill that was requested. Stated to him that the pharmacy reached out and there was no refills available so we went ahead and alerted Rosendo Pearson and Dr. David Carreno so that will hopefully be sent in by the end of the day. Stated he doesn't know what the issue is all of the sudden because anymore when he tries to fill his prescriptsions, there is nothing but problems. Asked him if he was having issues with the pharmacy or what the issue was? Stated that the issue isn't with the pharmacy, that it was with how the prescription was written or us getting the information to them. That he used to be able to just go in and get the prescription filled and no issues, there was always refills. Advised that I'm not sure how the prescription was written in the past and that from what I can see the prescription that was \"current\" was written in September with a 90 day supply with 0 refills. Would reach out to Rosendo Metro to alert her that this needs filled ASAP because patient is stating that has no pills left. Patient needs this to go to [facility].

## 2023-12-15 NOTE — TELEPHONE ENCOUNTER
Patient's last appointment was : 7/17/2023 with our   Patient's next appointment is : 2/28/2024 with our Dr. Sydni Hull  Last refilled on: 9/11/2023  Which pharmacy does the script need sent to: [facility]       Lab Results   Component Value Date    LABA1C 5.5 02/09/2022     Lab Results   Component Value Date    CHOL 166 02/09/2022    TRIG 266 (H) 02/09/2022    HDL 40 02/09/2022    LDLCALC 73 02/09/2022    LDLDIRECT 88 10/27/2017     Lab Results   Component Value Date     02/09/2022    K 3.8 02/09/2022     02/09/2022    CO2 30 02/09/2022    BUN 14 02/09/2022    CREATININE 1.14 02/09/2022    GLUCOSE 114 (H) 02/09/2022    CALCIUM 9.5 02/09/2022    PROT 6.6 02/09/2022    LABALBU 4.4 02/09/2022    BILITOT 1.0 02/09/2022    ALKPHOS 50 02/09/2022    AST 43 (H) 02/09/2022    ALT 81 (H) 02/09/2022    LABGLOM 71 (A) 09/18/2020     No results found for: \"TSH\", \"G7TNPXY\", \"S4RXJIJ\", \"THYROIDAB\", \"FT3\", \"T4FREE\"  Lab Results   Component Value Date    WBC 6.3 02/09/2022    HGB 16.3 02/09/2022    HCT 46.8 02/09/2022    MCV 90 02/09/2022     02/09/2022

## 2025-01-13 ENCOUNTER — HOSPITAL ENCOUNTER (EMERGENCY)
Age: 55
Discharge: HOME | End: 2025-01-13

## 2025-01-13 VITALS
RESPIRATION RATE: 16 BRPM | TEMPERATURE: 97 F | DIASTOLIC BLOOD PRESSURE: 96 MMHG | SYSTOLIC BLOOD PRESSURE: 152 MMHG | HEART RATE: 99 BPM | OXYGEN SATURATION: 97 %

## 2025-01-13 DIAGNOSIS — E78.5 HYPERLIPIDEMIA, UNSPECIFIED HYPERLIPIDEMIA TYPE: ICD-10-CM

## 2025-01-13 DIAGNOSIS — B02.9 HERPES ZOSTER WITHOUT COMPLICATION: Primary | ICD-10-CM

## 2025-01-13 PROCEDURE — 99203 OFFICE O/P NEW LOW 30 MIN: CPT | Performed by: NURSE PRACTITIONER

## 2025-01-13 PROCEDURE — 99213 OFFICE O/P EST LOW 20 MIN: CPT

## 2025-01-13 RX ORDER — VALACYCLOVIR HYDROCHLORIDE 1 G/1
1000 TABLET, FILM COATED ORAL 3 TIMES DAILY
Qty: 30 TABLET | Refills: 0 | Status: SHIPPED | OUTPATIENT
Start: 2025-01-13 | End: 2025-01-23

## 2025-01-13 ASSESSMENT — ENCOUNTER SYMPTOMS
APNEA: 0
THROAT SWELLING: 0
DIARRHEA: 0
HOARSE VOICE: 0
SHORTNESS OF BREATH: 0
COUGH: 0
PERI-ORBITAL EDEMA: 0
ABDOMINAL PAIN: 0
VOMITING: 0
NAUSEA: 0
STRIDOR: 0
SORE THROAT: 0
WHEEZING: 0
CHEST TIGHTNESS: 0
BACK PAIN: 1
CHOKING: 0

## 2025-01-13 NOTE — ED PROVIDER NOTES
[facility]  Urgent Care Encounter      CHIEF COMPLAINT       Chief Complaint   Patient presents with    Rash       Nurses Notes reviewed and I agree except as noted in the HPI.  HISTORY OFPRESENT ILLNESS   Arsenio Lima is a 54 y.o.  The history is provided by the patient. No  was used.   Rash  Location:  Pelvis  Pelvic rash location:  Pelvis  Quality: burning, painful and redness    Pain details:     Quality:  Burning and sore    Severity:  Moderate    Onset quality:  Gradual    Duration:  3 days (rash today)    Timing:  Constant    Progression:  Waxing and waning  Severity:  Moderate  Onset quality:  Gradual  Duration:  3 days  Timing:  Constant  Progression:  Waxing and waning (left thigh and glute pain improved but rash started)  Context: not animal contact, not chemical exposure, not diapers, not eggs, not exposure to similar rash, not food, not hot tub use, not insect bite/sting, not medications, not new detergent/soap, not nuts, not plant contact, not pollen, not pregnancy, not sick contacts and not sun exposure    Relieved by:  Nothing  Worsened by:  Heat and moisture  Ineffective treatments: stretching, rest.  Associated symptoms: induration and myalgias    Associated symptoms: no abdominal pain, no diarrhea, no fatigue, no fever, no headaches, no hoarse voice, no joint pain, no nausea, no periorbital edema, no shortness of breath, no sore throat, no throat swelling, no tongue swelling, no URI, not vomiting and not wheezing        REVIEW OF SYSTEMS     Review of Systems   Constitutional:  Positive for activity change. Negative for appetite change, chills, diaphoresis, fatigue and fever.   HENT:  Negative for hoarse voice and sore throat.    Respiratory:  Negative for apnea, cough, choking, chest tightness, shortness of breath, wheezing and stridor.    Cardiovascular:  Negative for chest pain, palpitations and leg swelling.   Gastrointestinal:  Negative for abdominal  pain, diarrhea, nausea and vomiting.   Musculoskeletal:  Positive for back pain and myalgias. Negative for arthralgias.   Skin:  Positive for rash.   Neurological:  Negative for headaches.       PAST MEDICAL HISTORY         Diagnosis Date    Headache(784.0)     Hyperlipidemia     Kidney stones 2020    around 2010 had stones        SURGICAL HISTORY     Patient  has a past surgical history that includes lipoma resection and Vasectomy.    CURRENT MEDICATIONS       Discharge Medication List as of 1/13/2025  6:40 PM        CONTINUE these medications which have NOT CHANGED    Details   atorvastatin (LIPITOR) 40 MG tablet Take 1 tablet by mouth daily, Disp-90 tablet, R-3Normal             ALLERGIES     Patient is is allergic to pcn [penicillins].    FAMILY HISTORY     Patient's family history includes Cancer (age of onset: 60) in his father; Heart Disease in his paternal grandfather.    SOCIAL HISTORY     Patient  reports that he has never smoked. He quit smokeless tobacco use about 28 years ago. He reports that he does not drink alcohol and does not use drugs.    PHYSICAL EXAM     ED TRIAGE VITALS  BP: (!) 152/96, Temp: 97 °F (36.1 °C), Pulse: 99, Respirations: 16, SpO2: 97 %  Physical Exam  Vitals and nursing note reviewed.   Constitutional:       General: He is not in acute distress.     Appearance: Normal appearance. He is normal weight. He is not ill-appearing, toxic-appearing or diaphoretic.   HENT:      Head: Normocephalic and atraumatic.      Right Ear: External ear normal.      Left Ear: External ear normal.   Eyes:      Extraocular Movements: Extraocular movements intact.      Conjunctiva/sclera: Conjunctivae normal.   Pulmonary:      Effort: Pulmonary effort is normal.   Musculoskeletal:         General: Normal range of motion.      Cervical back: Normal range of motion.   Skin:     General: Skin is warm and dry.          Neurological:      General: No focal deficit present.      Mental Status: He is alert and

## 2025-01-13 NOTE — ED NOTES
To [facility] with complaints of developing some pain in left groin area/leg a few days ago. Feels like a sunburn. Now has a rash in that area. Concerned for shingles     Cherie Schwab RN  01/13/25 [id number]

## 2025-01-13 NOTE — TELEPHONE ENCOUNTER
Patient's last appointment was: 7/17/2023  with our   Patient's next appointment is: 1/15/2025  with our Dr. Ruff  Last refilled on: 9/4/2024  Which pharmacy does the script need sent to: [facility]      Lab Results   Component Value Date    LABA1C 5.5 02/09/2022     Lab Results   Component Value Date    CHOL 166 02/09/2022    TRIG 266 (H) 02/09/2022    HDL 40 02/09/2022    LDLDIRECT 88 10/27/2017     Lab Results   Component Value Date     02/09/2022    K 3.8 02/09/2022     02/09/2022    CO2 30 02/09/2022    BUN 14 02/09/2022    CREATININE 1.14 02/09/2022    GLUCOSE 114 (H) 02/09/2022    CALCIUM 9.5 02/09/2022    BILITOT 1.0 02/09/2022    ALKPHOS 50 02/09/2022    AST 43 (H) 02/09/2022    ALT 81 (H) 02/09/2022    LABGLOM 71 (A) 09/18/2020     No results found for: \"TSH\", \"Q8FZXUH\", \"THYROIDAB\", \"FT3\", \"T4FREE\"  Lab Results   Component Value Date    WBC 6.3 02/09/2022    HGB 16.3 02/09/2022    HCT 46.8 02/09/2022    MCV 90 02/09/2022     02/09/2022

## 2025-01-13 NOTE — TELEPHONE ENCOUNTER
Patient has appointment in 2 days with Dr. Ruff.  Does patient need refill prior to that time?  I do not see any recently updated labs within past 12 months.  Thanks.  ES

## 2025-01-31 RX ORDER — ATORVASTATIN CALCIUM 40 MG/1
40 TABLET, FILM COATED ORAL DAILY
Qty: 90 TABLET | Refills: 3 | OUTPATIENT
Start: 2025-01-31